# Patient Record
Sex: MALE | Race: WHITE | NOT HISPANIC OR LATINO | URBAN - METROPOLITAN AREA
[De-identification: names, ages, dates, MRNs, and addresses within clinical notes are randomized per-mention and may not be internally consistent; named-entity substitution may affect disease eponyms.]

---

## 2018-03-12 ENCOUNTER — EMERGENCY (EMERGENCY)
Facility: HOSPITAL | Age: 58
LOS: 1 days | Discharge: ANOTHER HOSPITAL | End: 2018-03-12
Attending: EMERGENCY MEDICINE
Payer: COMMERCIAL

## 2018-03-12 ENCOUNTER — INPATIENT (INPATIENT)
Facility: HOSPITAL | Age: 58
LOS: 2 days | Discharge: HOME | DRG: 247 | End: 2018-03-15
Attending: STUDENT IN AN ORGANIZED HEALTH CARE EDUCATION/TRAINING PROGRAM | Admitting: INTERNAL MEDICINE
Payer: COMMERCIAL

## 2018-03-12 VITALS
RESPIRATION RATE: 16 BRPM | DIASTOLIC BLOOD PRESSURE: 91 MMHG | SYSTOLIC BLOOD PRESSURE: 139 MMHG | TEMPERATURE: 99 F | OXYGEN SATURATION: 100 % | HEART RATE: 72 BPM

## 2018-03-12 VITALS
SYSTOLIC BLOOD PRESSURE: 129 MMHG | RESPIRATION RATE: 17 BRPM | WEIGHT: 199.96 LBS | OXYGEN SATURATION: 98 % | TEMPERATURE: 96 F | HEIGHT: 70 IN | HEART RATE: 67 BPM | DIASTOLIC BLOOD PRESSURE: 85 MMHG

## 2018-03-12 VITALS — WEIGHT: 204.15 LBS | HEIGHT: 69 IN

## 2018-03-12 DIAGNOSIS — Z87.891 PERSONAL HISTORY OF NICOTINE DEPENDENCE: ICD-10-CM

## 2018-03-12 DIAGNOSIS — I21.3 ST ELEVATION (STEMI) MYOCARDIAL INFARCTION OF UNSPECIFIED SITE: ICD-10-CM

## 2018-03-12 DIAGNOSIS — G62.9 POLYNEUROPATHY, UNSPECIFIED: ICD-10-CM

## 2018-03-12 DIAGNOSIS — R07.9 CHEST PAIN, UNSPECIFIED: ICD-10-CM

## 2018-03-12 DIAGNOSIS — I25.10 ATHEROSCLEROTIC HEART DISEASE OF NATIVE CORONARY ARTERY WITHOUT ANGINA PECTORIS: ICD-10-CM

## 2018-03-12 LAB
ALBUMIN SERPL ELPH-MCNC: 4.3 G/DL — SIGNIFICANT CHANGE UP (ref 3.3–5)
ALP SERPL-CCNC: 105 U/L — SIGNIFICANT CHANGE UP (ref 40–120)
ALT FLD-CCNC: 53 U/L — SIGNIFICANT CHANGE UP (ref 12–78)
ANION GAP SERPL CALC-SCNC: 7 MMOL/L — SIGNIFICANT CHANGE UP (ref 5–17)
APTT BLD: 28.2 SEC — SIGNIFICANT CHANGE UP (ref 27.5–37.4)
AST SERPL-CCNC: 31 U/L — SIGNIFICANT CHANGE UP (ref 15–37)
BASOPHILS # BLD AUTO: 0.06 K/UL — SIGNIFICANT CHANGE UP (ref 0–0.2)
BASOPHILS NFR BLD AUTO: 0.5 % — SIGNIFICANT CHANGE UP (ref 0–2)
BILIRUB SERPL-MCNC: 0.4 MG/DL — SIGNIFICANT CHANGE UP (ref 0.2–1.2)
BLD GP AB SCN SERPL QL: SIGNIFICANT CHANGE UP
BUN SERPL-MCNC: 22 MG/DL — SIGNIFICANT CHANGE UP (ref 7–23)
CALCIUM SERPL-MCNC: 8.7 MG/DL — SIGNIFICANT CHANGE UP (ref 8.5–10.1)
CHLORIDE SERPL-SCNC: 105 MMOL/L — SIGNIFICANT CHANGE UP (ref 96–108)
CK MB BLD-MCNC: 1.1 % — SIGNIFICANT CHANGE UP (ref 0–3.5)
CK MB CFR SERPL CALC: 2.7 NG/ML — SIGNIFICANT CHANGE UP (ref 0.5–3.6)
CK SERPL-CCNC: 248 U/L — SIGNIFICANT CHANGE UP (ref 26–308)
CO2 SERPL-SCNC: 30 MMOL/L — SIGNIFICANT CHANGE UP (ref 22–31)
CREAT SERPL-MCNC: 1.05 MG/DL — SIGNIFICANT CHANGE UP (ref 0.5–1.3)
EOSINOPHIL # BLD AUTO: 0.13 K/UL — SIGNIFICANT CHANGE UP (ref 0–0.5)
EOSINOPHIL NFR BLD AUTO: 1 % — SIGNIFICANT CHANGE UP (ref 0–6)
GLUCOSE SERPL-MCNC: 131 MG/DL — HIGH (ref 70–99)
HCT VFR BLD CALC: 48 % — SIGNIFICANT CHANGE UP (ref 39–50)
HGB BLD-MCNC: 15.8 G/DL — SIGNIFICANT CHANGE UP (ref 13–17)
IMM GRANULOCYTES NFR BLD AUTO: 0.3 % — SIGNIFICANT CHANGE UP (ref 0–1.5)
INR BLD: 0.99 RATIO — SIGNIFICANT CHANGE UP (ref 0.88–1.16)
LYMPHOCYTES # BLD AUTO: 1.82 K/UL — SIGNIFICANT CHANGE UP (ref 1–3.3)
LYMPHOCYTES # BLD AUTO: 14.2 % — SIGNIFICANT CHANGE UP (ref 13–44)
MCHC RBC-ENTMCNC: 30.3 PG — SIGNIFICANT CHANGE UP (ref 27–34)
MCHC RBC-ENTMCNC: 32.9 GM/DL — SIGNIFICANT CHANGE UP (ref 32–36)
MCV RBC AUTO: 92 FL — SIGNIFICANT CHANGE UP (ref 80–100)
MONOCYTES # BLD AUTO: 0.89 K/UL — SIGNIFICANT CHANGE UP (ref 0–0.9)
MONOCYTES NFR BLD AUTO: 6.9 % — SIGNIFICANT CHANGE UP (ref 2–14)
NEUTROPHILS # BLD AUTO: 9.88 K/UL — HIGH (ref 1.8–7.4)
NEUTROPHILS NFR BLD AUTO: 77.1 % — HIGH (ref 43–77)
NRBC # BLD: 0 /100 WBCS — SIGNIFICANT CHANGE UP (ref 0–0)
PLATELET # BLD AUTO: 261 K/UL — SIGNIFICANT CHANGE UP (ref 150–400)
POTASSIUM SERPL-MCNC: 3.9 MMOL/L — SIGNIFICANT CHANGE UP (ref 3.5–5.3)
POTASSIUM SERPL-SCNC: 3.9 MMOL/L — SIGNIFICANT CHANGE UP (ref 3.5–5.3)
PROT SERPL-MCNC: 7.4 GM/DL — SIGNIFICANT CHANGE UP (ref 6–8.3)
PROTHROM AB SERPL-ACNC: 10.8 SEC — SIGNIFICANT CHANGE UP (ref 9.8–12.7)
RBC # BLD: 5.22 M/UL — SIGNIFICANT CHANGE UP (ref 4.2–5.8)
RBC # FLD: 13.4 % — SIGNIFICANT CHANGE UP (ref 10.3–14.5)
SODIUM SERPL-SCNC: 142 MMOL/L — SIGNIFICANT CHANGE UP (ref 135–145)
TROPONIN I SERPL-MCNC: 0.08 NG/ML — HIGH (ref 0.01–0.04)
WBC # BLD: 12.82 K/UL — HIGH (ref 3.8–10.5)
WBC # FLD AUTO: 12.82 K/UL — HIGH (ref 3.8–10.5)

## 2018-03-12 PROCEDURE — 92929: CPT | Mod: NC

## 2018-03-12 PROCEDURE — 99152 MOD SED SAME PHYS/QHP 5/>YRS: CPT

## 2018-03-12 PROCEDURE — 71045 X-RAY EXAM CHEST 1 VIEW: CPT | Mod: 26

## 2018-03-12 PROCEDURE — 99291 CRITICAL CARE FIRST HOUR: CPT

## 2018-03-12 PROCEDURE — 71275 CT ANGIOGRAPHY CHEST: CPT | Mod: 26

## 2018-03-12 PROCEDURE — 92941 PRQ TRLML REVSC TOT OCCL AMI: CPT | Mod: LC

## 2018-03-12 PROCEDURE — 93458 L HRT ARTERY/VENTRICLE ANGIO: CPT | Mod: 26,59

## 2018-03-12 PROCEDURE — 74174 CTA ABD&PLVS W/CONTRAST: CPT | Mod: 26

## 2018-03-12 RX ORDER — TICAGRELOR 90 MG/1
90 TABLET ORAL
Qty: 0 | Refills: 0 | Status: DISCONTINUED | OUTPATIENT
Start: 2018-03-12 | End: 2018-03-15

## 2018-03-12 RX ORDER — HEPARIN SODIUM 5000 [USP'U]/ML
4000 INJECTION INTRAVENOUS; SUBCUTANEOUS EVERY 6 HOURS
Qty: 0 | Refills: 0 | Status: DISCONTINUED | OUTPATIENT
Start: 2018-03-12 | End: 2018-03-16

## 2018-03-12 RX ORDER — HEPARIN SODIUM 5000 [USP'U]/ML
4000 INJECTION INTRAVENOUS; SUBCUTANEOUS ONCE
Qty: 0 | Refills: 0 | Status: DISCONTINUED | OUTPATIENT
Start: 2018-03-12 | End: 2018-03-16

## 2018-03-12 RX ORDER — MORPHINE SULFATE 50 MG/1
4 CAPSULE, EXTENDED RELEASE ORAL ONCE
Qty: 0 | Refills: 0 | Status: DISCONTINUED | OUTPATIENT
Start: 2018-03-12 | End: 2018-03-12

## 2018-03-12 RX ORDER — INFLUENZA VIRUS VACCINE 15; 15; 15; 15 UG/.5ML; UG/.5ML; UG/.5ML; UG/.5ML
0.5 SUSPENSION INTRAMUSCULAR ONCE
Qty: 0 | Refills: 0 | Status: DISCONTINUED | OUTPATIENT
Start: 2018-03-12 | End: 2018-03-15

## 2018-03-12 RX ORDER — ASPIRIN/CALCIUM CARB/MAGNESIUM 324 MG
162 TABLET ORAL ONCE
Qty: 0 | Refills: 0 | Status: COMPLETED | OUTPATIENT
Start: 2018-03-12 | End: 2018-03-12

## 2018-03-12 RX ORDER — HEPARIN SODIUM 5000 [USP'U]/ML
5000 INJECTION INTRAVENOUS; SUBCUTANEOUS EVERY 12 HOURS
Qty: 0 | Refills: 0 | Status: DISCONTINUED | OUTPATIENT
Start: 2018-03-12 | End: 2018-03-15

## 2018-03-12 RX ORDER — HEPARIN SODIUM 5000 [USP'U]/ML
INJECTION INTRAVENOUS; SUBCUTANEOUS
Qty: 25000 | Refills: 0 | Status: DISCONTINUED | OUTPATIENT
Start: 2018-03-12 | End: 2018-03-16

## 2018-03-12 RX ORDER — SODIUM CHLORIDE 9 MG/ML
3 INJECTION INTRAMUSCULAR; INTRAVENOUS; SUBCUTANEOUS ONCE
Qty: 0 | Refills: 0 | Status: COMPLETED | OUTPATIENT
Start: 2018-03-12 | End: 2018-03-12

## 2018-03-12 RX ORDER — SODIUM CHLORIDE 9 MG/ML
1000 INJECTION INTRAMUSCULAR; INTRAVENOUS; SUBCUTANEOUS ONCE
Qty: 0 | Refills: 0 | Status: COMPLETED | OUTPATIENT
Start: 2018-03-12 | End: 2018-03-12

## 2018-03-12 RX ORDER — ASPIRIN/CALCIUM CARB/MAGNESIUM 324 MG
81 TABLET ORAL DAILY
Qty: 0 | Refills: 0 | Status: DISCONTINUED | OUTPATIENT
Start: 2018-03-12 | End: 2018-03-15

## 2018-03-12 RX ORDER — TICAGRELOR 90 MG/1
180 TABLET ORAL ONCE
Qty: 0 | Refills: 0 | Status: COMPLETED | OUTPATIENT
Start: 2018-03-12 | End: 2018-03-12

## 2018-03-12 RX ORDER — ATORVASTATIN CALCIUM 80 MG/1
80 TABLET, FILM COATED ORAL AT BEDTIME
Qty: 0 | Refills: 0 | Status: DISCONTINUED | OUTPATIENT
Start: 2018-03-12 | End: 2018-03-15

## 2018-03-12 RX ADMIN — SODIUM CHLORIDE 2000 MILLILITER(S): 9 INJECTION INTRAMUSCULAR; INTRAVENOUS; SUBCUTANEOUS at 21:31

## 2018-03-12 RX ADMIN — MORPHINE SULFATE 4 MILLIGRAM(S): 50 CAPSULE, EXTENDED RELEASE ORAL at 21:30

## 2018-03-12 RX ADMIN — SODIUM CHLORIDE 3 MILLILITER(S): 9 INJECTION INTRAMUSCULAR; INTRAVENOUS; SUBCUTANEOUS at 21:31

## 2018-03-12 RX ADMIN — Medication 162 MILLIGRAM(S): at 21:30

## 2018-03-12 RX ADMIN — TICAGRELOR 180 MILLIGRAM(S): 90 TABLET ORAL at 21:30

## 2018-03-12 NOTE — ED PROVIDER NOTE - MEDICAL DECISION MAKING DETAILS
Called [facility] immediately after abnormal EKG.  Fellow reviewed EKG and approved lights&sirens tranfer of patient for emergent cath.  Patient given Brilinta, ASA, and heparin, controlled pain and BP upon arrival  of EMS.  Transferred to [facility] Cath lab for definitive treatment.

## 2018-03-12 NOTE — ED PROVIDER NOTE - NS ED ROS FT
Constitutional: (-) fever  (-)chills  (-)sweats  Eyes/ENT: (-) blurry vision, (-) epistaxis  (-)rhinorrhea   (-) sore throat    Cardiovascular: (-+) chest pain, (+) palpitations (-) edema   Respiratory: (-) cough, (-) shortness of breath   Gastrointestinal: (+)nausea  (-)vomiting, (-) diarrhea  (-) abdominal pain   Musculoskeletal: (-) neck pain, (-) back pain, (-) joint pain  Integumentary: (-) rash, (-) edema  Neurological: (-) headache, (-) altered mental status  (-)LOC

## 2018-03-13 LAB
ALBUMIN SERPL ELPH-MCNC: 4 G/DL — SIGNIFICANT CHANGE UP (ref 3.3–5)
ALBUMIN SERPL ELPH-MCNC: 4.1 G/DL — SIGNIFICANT CHANGE UP (ref 3.3–5)
ALP SERPL-CCNC: 101 U/L — SIGNIFICANT CHANGE UP (ref 40–120)
ALP SERPL-CCNC: 98 U/L — SIGNIFICANT CHANGE UP (ref 40–120)
ALT FLD-CCNC: 42 U/L RC — SIGNIFICANT CHANGE UP (ref 10–45)
ALT FLD-CCNC: 52 U/L RC — HIGH (ref 10–45)
ANION GAP SERPL CALC-SCNC: 12 MMOL/L — SIGNIFICANT CHANGE UP (ref 5–17)
ANION GAP SERPL CALC-SCNC: 13 MMOL/L — SIGNIFICANT CHANGE UP (ref 5–17)
APTT BLD: 175.7 SEC — CRITICAL HIGH (ref 27.5–37.4)
AST SERPL-CCNC: 182 U/L — HIGH (ref 10–40)
AST SERPL-CCNC: 76 U/L — HIGH (ref 10–40)
BASOPHILS # BLD AUTO: 0 K/UL — SIGNIFICANT CHANGE UP (ref 0–0.2)
BASOPHILS NFR BLD AUTO: 0.2 % — SIGNIFICANT CHANGE UP (ref 0–2)
BILIRUB SERPL-MCNC: 0.3 MG/DL — SIGNIFICANT CHANGE UP (ref 0.2–1.2)
BILIRUB SERPL-MCNC: 0.6 MG/DL — SIGNIFICANT CHANGE UP (ref 0.2–1.2)
BUN SERPL-MCNC: 14 MG/DL — SIGNIFICANT CHANGE UP (ref 7–23)
BUN SERPL-MCNC: 18 MG/DL — SIGNIFICANT CHANGE UP (ref 7–23)
CALCIUM SERPL-MCNC: 8.8 MG/DL — SIGNIFICANT CHANGE UP (ref 8.4–10.5)
CALCIUM SERPL-MCNC: 9.2 MG/DL — SIGNIFICANT CHANGE UP (ref 8.4–10.5)
CHLORIDE SERPL-SCNC: 101 MMOL/L — SIGNIFICANT CHANGE UP (ref 96–108)
CHLORIDE SERPL-SCNC: 104 MMOL/L — SIGNIFICANT CHANGE UP (ref 96–108)
CHOLEST SERPL-MCNC: 205 MG/DL — HIGH (ref 10–199)
CK MB BLD-MCNC: 11.2 % — HIGH (ref 0–3.5)
CK MB BLD-MCNC: 11.7 % — HIGH (ref 0–3.5)
CK MB BLD-MCNC: 14.8 % — HIGH (ref 0–3.5)
CK MB CFR SERPL CALC: 158.7 NG/ML — HIGH (ref 0–6.7)
CK MB CFR SERPL CALC: 161.7 NG/ML — HIGH (ref 0–6.7)
CK MB CFR SERPL CALC: 272.2 NG/ML — HIGH (ref 0–6.7)
CK SERPL-CCNC: 1071 U/L — HIGH (ref 30–200)
CK SERPL-CCNC: 1443 U/L — HIGH (ref 30–200)
CK SERPL-CCNC: 2320 U/L — HIGH (ref 30–200)
CO2 SERPL-SCNC: 22 MMOL/L — SIGNIFICANT CHANGE UP (ref 22–31)
CO2 SERPL-SCNC: 25 MMOL/L — SIGNIFICANT CHANGE UP (ref 22–31)
CREAT SERPL-MCNC: 0.86 MG/DL — SIGNIFICANT CHANGE UP (ref 0.5–1.3)
CREAT SERPL-MCNC: 0.89 MG/DL — SIGNIFICANT CHANGE UP (ref 0.5–1.3)
EOSINOPHIL # BLD AUTO: 0 K/UL — SIGNIFICANT CHANGE UP (ref 0–0.5)
EOSINOPHIL NFR BLD AUTO: 0.1 % — SIGNIFICANT CHANGE UP (ref 0–6)
GLUCOSE SERPL-MCNC: 108 MG/DL — HIGH (ref 70–99)
GLUCOSE SERPL-MCNC: 109 MG/DL — HIGH (ref 70–99)
HBA1C BLD-MCNC: 5.7 % — HIGH (ref 4–5.6)
HCT VFR BLD CALC: 46 % — SIGNIFICANT CHANGE UP (ref 39–50)
HDLC SERPL-MCNC: 47 MG/DL — SIGNIFICANT CHANGE UP (ref 40–125)
HGB BLD-MCNC: 15.9 G/DL — SIGNIFICANT CHANGE UP (ref 13–17)
LIPID PNL WITH DIRECT LDL SERPL: 150 MG/DL — HIGH
LYMPHOCYTES # BLD AUTO: 0.9 K/UL — LOW (ref 1–3.3)
LYMPHOCYTES # BLD AUTO: 7.2 % — LOW (ref 13–44)
MAGNESIUM SERPL-MCNC: 2 MG/DL — SIGNIFICANT CHANGE UP (ref 1.6–2.6)
MAGNESIUM SERPL-MCNC: 2.3 MG/DL — SIGNIFICANT CHANGE UP (ref 1.6–2.6)
MCHC RBC-ENTMCNC: 32.7 PG — SIGNIFICANT CHANGE UP (ref 27–34)
MCHC RBC-ENTMCNC: 34.7 GM/DL — SIGNIFICANT CHANGE UP (ref 32–36)
MCV RBC AUTO: 94.3 FL — SIGNIFICANT CHANGE UP (ref 80–100)
MONOCYTES # BLD AUTO: 0.4 K/UL — SIGNIFICANT CHANGE UP (ref 0–0.9)
MONOCYTES NFR BLD AUTO: 2.8 % — SIGNIFICANT CHANGE UP (ref 2–14)
NEUTROPHILS # BLD AUTO: 11.6 K/UL — HIGH (ref 1.8–7.4)
NEUTROPHILS NFR BLD AUTO: 89.7 % — HIGH (ref 43–77)
PHOSPHATE SERPL-MCNC: 2.2 MG/DL — LOW (ref 2.5–4.5)
PLATELET # BLD AUTO: 241 K/UL — SIGNIFICANT CHANGE UP (ref 150–400)
POTASSIUM SERPL-MCNC: 4.1 MMOL/L — SIGNIFICANT CHANGE UP (ref 3.5–5.3)
POTASSIUM SERPL-MCNC: 4.1 MMOL/L — SIGNIFICANT CHANGE UP (ref 3.5–5.3)
POTASSIUM SERPL-SCNC: 4.1 MMOL/L — SIGNIFICANT CHANGE UP (ref 3.5–5.3)
POTASSIUM SERPL-SCNC: 4.1 MMOL/L — SIGNIFICANT CHANGE UP (ref 3.5–5.3)
PROT SERPL-MCNC: 6.8 G/DL — SIGNIFICANT CHANGE UP (ref 6–8.3)
PROT SERPL-MCNC: 7.2 G/DL — SIGNIFICANT CHANGE UP (ref 6–8.3)
RBC # BLD: 4.88 M/UL — SIGNIFICANT CHANGE UP (ref 4.2–5.8)
RBC # FLD: 12.4 % — SIGNIFICANT CHANGE UP (ref 10.3–14.5)
SODIUM SERPL-SCNC: 138 MMOL/L — SIGNIFICANT CHANGE UP (ref 135–145)
SODIUM SERPL-SCNC: 139 MMOL/L — SIGNIFICANT CHANGE UP (ref 135–145)
TOTAL CHOLESTEROL/HDL RATIO MEASUREMENT: 4.4 RATIO — SIGNIFICANT CHANGE UP (ref 3.4–9.6)
TRIGL SERPL-MCNC: 41 MG/DL — SIGNIFICANT CHANGE UP (ref 10–149)
TROPONIN T SERPL-MCNC: 0.61 NG/ML — HIGH (ref 0–0.06)
TROPONIN T SERPL-MCNC: 2.89 NG/ML — HIGH (ref 0–0.06)
TROPONIN T SERPL-MCNC: 3.42 NG/ML — HIGH (ref 0–0.06)
TSH SERPL-MCNC: 1.21 UIU/ML — SIGNIFICANT CHANGE UP (ref 0.27–4.2)
WBC # BLD: 12.9 K/UL — HIGH (ref 3.8–10.5)
WBC # FLD AUTO: 12.9 K/UL — HIGH (ref 3.8–10.5)

## 2018-03-13 PROCEDURE — 99291 CRITICAL CARE FIRST HOUR: CPT

## 2018-03-13 PROCEDURE — 93010 ELECTROCARDIOGRAM REPORT: CPT

## 2018-03-13 RX ORDER — METOPROLOL TARTRATE 50 MG
25 TABLET ORAL
Qty: 0 | Refills: 0 | Status: DISCONTINUED | OUTPATIENT
Start: 2018-03-13 | End: 2018-03-13

## 2018-03-13 RX ORDER — AMIODARONE HYDROCHLORIDE 400 MG/1
150 TABLET ORAL ONCE
Qty: 0 | Refills: 0 | Status: COMPLETED | OUTPATIENT
Start: 2018-03-13 | End: 2018-03-13

## 2018-03-13 RX ORDER — LIDOCAINE HCL 20 MG/ML
92 VIAL (ML) INJECTION ONCE
Qty: 0 | Refills: 0 | Status: COMPLETED | OUTPATIENT
Start: 2018-03-13 | End: 2018-03-13

## 2018-03-13 RX ORDER — MAGNESIUM SULFATE 500 MG/ML
1 VIAL (ML) INJECTION ONCE
Qty: 0 | Refills: 0 | Status: COMPLETED | OUTPATIENT
Start: 2018-03-13 | End: 2018-03-13

## 2018-03-13 RX ORDER — METOPROLOL TARTRATE 50 MG
25 TABLET ORAL EVERY 8 HOURS
Qty: 0 | Refills: 0 | Status: DISCONTINUED | OUTPATIENT
Start: 2018-03-13 | End: 2018-03-13

## 2018-03-13 RX ORDER — SODIUM,POTASSIUM PHOSPHATES 278-250MG
1 POWDER IN PACKET (EA) ORAL EVERY 4 HOURS
Qty: 0 | Refills: 0 | Status: COMPLETED | OUTPATIENT
Start: 2018-03-13 | End: 2018-03-13

## 2018-03-13 RX ORDER — AMIODARONE HYDROCHLORIDE 400 MG/1
150 TABLET ORAL ONCE
Qty: 0 | Refills: 0 | Status: DISCONTINUED | OUTPATIENT
Start: 2018-03-13 | End: 2018-03-13

## 2018-03-13 RX ORDER — METOPROLOL TARTRATE 50 MG
25 TABLET ORAL EVERY 8 HOURS
Qty: 0 | Refills: 0 | Status: DISCONTINUED | OUTPATIENT
Start: 2018-03-13 | End: 2018-03-15

## 2018-03-13 RX ADMIN — Medication 25 MILLIGRAM(S): at 21:20

## 2018-03-13 RX ADMIN — TICAGRELOR 90 MILLIGRAM(S): 90 TABLET ORAL at 18:10

## 2018-03-13 RX ADMIN — Medication 1 TABLET(S): at 07:10

## 2018-03-13 RX ADMIN — Medication 92 MILLIGRAM(S): at 05:52

## 2018-03-13 RX ADMIN — TICAGRELOR 90 MILLIGRAM(S): 90 TABLET ORAL at 05:05

## 2018-03-13 RX ADMIN — HEPARIN SODIUM 5000 UNIT(S): 5000 INJECTION INTRAVENOUS; SUBCUTANEOUS at 05:04

## 2018-03-13 RX ADMIN — AMIODARONE HYDROCHLORIDE 600 MILLIGRAM(S): 400 TABLET ORAL at 03:03

## 2018-03-13 RX ADMIN — HEPARIN SODIUM 5000 UNIT(S): 5000 INJECTION INTRAVENOUS; SUBCUTANEOUS at 18:10

## 2018-03-13 RX ADMIN — Medication 25 MILLIGRAM(S): at 08:27

## 2018-03-13 RX ADMIN — Medication 100 GRAM(S): at 03:08

## 2018-03-13 RX ADMIN — ATORVASTATIN CALCIUM 80 MILLIGRAM(S): 80 TABLET, FILM COATED ORAL at 21:20

## 2018-03-13 RX ADMIN — Medication 81 MILLIGRAM(S): at 11:41

## 2018-03-13 RX ADMIN — Medication 1 TABLET(S): at 05:04

## 2018-03-13 NOTE — CHART NOTE - NSCHARTNOTEFT_GEN_A_CORE
Removal of Femoral Sheath    Pulses in the right lower extremity are palpable. The patient was placed in the supine position. The insertion site was identified and the sutures were removed per protocol.  The 6 [place] femoral sheath was then removed. Direct pressure was applied for  20 minutes.     Monitoring of the right groin and both lower extremities including neuro-vascular checks and vital signs every 15 minutes x 4, then every 30 minutes x 2, then every 1 hour was ordered.    Complications: None

## 2018-03-13 NOTE — H&P ADULT - ATTENDING COMMENTS
Patient seen and examined. Agree with assessment and plan as outlined above.  59 yo M with inferior/posterior STEMI s/p PCI to totally occluded LCx. Patient for staged PCI to RCA. Mulitple episodes of NSVT. Patient given IV amiodarone/IV labetalol. Start metoprolol 25 mg q8hr. Consider IABP if recurrent VT. Continue CCU care.

## 2018-03-13 NOTE — H&P ADULT - NSHPLABSRESULTS_GEN_ALL_CORE
15.9   12.9  )-----------( 241      ( 13 Mar 2018 01:35 )             46.0       03-13    138  |  101  |  18  ----------------------------<  109<H>  4.1   |  25  |  0.89    Ca    8.8      13 Mar 2018 01:35  Phos  2.2     03-13  Mg     2.0     03-13    TPro  7.2  /  Alb  4.1  /  TBili  0.3  /  DBili  x   /  AST  76<H>  /  ALT  42  /  AlkPhos  98  03-13    LHC:  CORONARY VESSELS: The coronary circulation is right dominant.  LM:   --  LM: Normal.  LAD:   --  LAD: Angiography showed minor luminal irregularities with no  flow limiting lesions.  --  Proximal LAD: There was a discrete 30 % stenosis.  --  Mid LAD: There was a discrete 40 % stenosis.  --  D2: The vessel was very small sized. There was a discrete 80 % stenosis  in the proximal third of the vessel segment. There was RODRICK grade 3 flow  through the vessel (brisk flow).  CX:   --  Mid circumflex: There was a 100 % stenosis.  RCA:   --  Proximal RCA: There was a discrete 70 % stenosis. There was RODRICK  grade 3 flow through the vessel (brisk flow).  COMPLICATIONS: There were no complications.  SUMMARY:  1ST LESION INTERVENTIONS: A successful balloon angioplasty with stent was  performed on the 100 % lesion in the mid circumflex. Following  intervention there was a 1 % residual stenosis.                    PT/INR - ( 12 Mar 2018 21:03 )   PT: 10.8 sec;   INR: 0.99 ratio         PTT - ( 13 Mar 2018 01:35 )  PTT:175.7 sec        CARDIAC MARKERS ( 13 Mar 2018 01:35 )  x     / 0.61 ng/mL / 1071 U/L / x     / 158.7 ng/mL  CARDIAC MARKERS ( 12 Mar 2018 21:03 )  .081 ng/mL / x     / 248 U/L / x     / 2.7 ng/mL      EKG- personally interpreted NSR, HR 69, QTc 411, normal axis, ST seg elevations in posterior leads and III, ST seg depression in V3 15.9   12.9  )-----------( 241      ( 13 Mar 2018 01:35 )             46.0       03-13    138  |  101  |  18  ----------------------------<  109<H>  4.1   |  25  |  0.89    Ca    8.8      13 Mar 2018 01:35  Phos  2.2     03-13  Mg     2.0     03-13    TPro  7.2  /  Alb  4.1  /  TBili  0.3  /  DBili  x   /  AST  76<H>  /  ALT  42  /  AlkPhos  98  03-13      PT/INR - ( 12 Mar 2018 21:03 )   PT: 10.8 sec;   INR: 0.99 ratio         PTT - ( 13 Mar 2018 01:35 )  PTT:175.7 sec        CARDIAC MARKERS ( 13 Mar 2018 01:35 )  x     / 0.61 ng/mL / 1071 U/L / x     / 158.7 ng/mL  CARDIAC MARKERS ( 12 Mar 2018 21:03 )  .081 ng/mL / x     / 248 U/L / x     / 2.7 ng/mL      EKG- personally interpreted NSR, HR 69, QTc 411, normal axis, ST seg elevations in posterior leads and III, ST seg depression in V3    LHC:  CORONARY VESSELS: The coronary circulation is right dominant.  LM:   --  LM: Normal.  LAD:   --  LAD: Angiography showed minor luminal irregularities with no  flow limiting lesions.  --  Proximal LAD: There was a discrete 30 % stenosis.  --  Mid LAD: There was a discrete 40 % stenosis.  --  D2: The vessel was very small sized. There was a discrete 80 % stenosis  in the proximal third of the vessel segment. There was RODRICK grade 3 flow  through the vessel (brisk flow).  CX:   --  Mid circumflex: There was a 100 % stenosis.  RCA:   --  Proximal RCA: There was a discrete 70 % stenosis. There was RODRICK  grade 3 flow through the vessel (brisk flow).  COMPLICATIONS: There were no complications.  SUMMARY:  1ST LESION INTERVENTIONS: A successful balloon angioplasty with stent was  performed on the 100 % lesion in the mid circumflex. Following  intervention there was a 1 % residual stenosis.    < from: CT Angio Chest w/ IV Cont (03.12.18 @ 21:18) >    IMPRESSION: No aortic dissection.    < end of copied text >

## 2018-03-13 NOTE — PROVIDER CONTACT NOTE (CRITICAL VALUE NOTIFICATION) - ASSESSMENT
nos/s pfbleeding rt groinnsheathnin ploace , site unremarkable, rt wrist attempted cannulation siteradialband in place, site beign

## 2018-03-13 NOTE — PROGRESS NOTE ADULT - SUBJECTIVE AND OBJECTIVE BOX
Patient is a 58y old  Male who presents with a chief complaint of Chest pain (13 Mar 2018 01:20)    Event:  Had persistent runs of NSVT during which patient reported feeling lightheaded and dizzy. Gave 150mg amio and then Lidocaine 1mg/kg.   After lidocaine, patient reported feeling lightheaded, but remained HD stable and had less episodes of NSVT.   Patient reports mild persistent CP, but significantly improved from before.   Denies any SOB, abd pain, N/V, dysuria or headaches.     HPI:  57 y/o M w/ PMHx of GERD p/w chest pain.   Patient reports developing CP on his way home from work around 6:30pm. He describes the pain as a pressure across his entire chest. He reports developing bilateral shoulder pain radiating to his axilla with the chest pain. Also acknowledges generalized weakness.  When he arrived ned he tried lying down, but reports the pain persisted and he became diaphoretic.   His pain didn't improve so he went to [facility] ED.   Of note, the patient reports developing worsening MORRISON over the last few months, especially while ascending stairs.     In the ED,   Given Heparin, ASA and transferred to [facility] for emergent LHC.   Had CT angio chest/A/P- no evidence of aortic dissection (13 Mar 2018 01:20)    MEDICATIONS  (STANDING):  aspirin enteric coated 81 milliGRAM(s) Oral daily  atorvastatin 80 milliGRAM(s) Oral at bedtime  heparin  Injectable 5000 Unit(s) SubCutaneous every 12 hours  influenza   Vaccine 0.5 milliLiter(s) IntraMuscular once  potassium acid phosphate/sodium acid phosphate tablet (K-PHOS No. 2) 1 Tablet(s) Oral every 4 hours  ticagrelor 90 milliGRAM(s) Oral two times a day    MEDICATIONS  (PRN):      REVIEW OF SYSTEMS:  Otherwise, 10 point ROS done and otherwise negative.    PHYSICAL EXAM:  Vital Signs Last 24 Hrs  T(C): 36.7 (13 Mar 2018 03:00), Max: 37.1 (12 Mar 2018 20:40)  T(F): 98.1 (13 Mar 2018 03:00), Max: 98.7 (12 Mar 2018 20:40)  HR: 76 (13 Mar 2018 04:00) (67 - 88)  BP: 122/72 (13 Mar 2018 04:00) (93/59 - 139/91)  BP(mean): 90 (13 Mar 2018 04:00) (71 - 92)  RR: 19 (13 Mar 2018 04:00) (16 - 26)  SpO2: 92% (13 Mar 2018 01:45) (92% - 100%)  I&O's Summary    12 Mar 2018 07:01  -  13 Mar 2018 06:20  --------------------------------------------------------  IN: 350 mL / OUT: 650 mL / NET: -300 mL        	PHYSICAL EXAM:    GENERAL: Comfortable, no acute distress   HEAD:  Normocephalic, atraumatic  EYES: EOMI, PERRLA  HEENT: Moist mucous membranes  NECK: Supple, No JVD  NERVOUS SYSTEM:  Alert & Oriented X3, Motor Strength 5/5 B/L upper and lower extremities  CHEST/LUNG: Clear to auscultation bilaterally  HEART: Regular rate and rhythm, no murmur   ABDOMEN: Soft, Nontender, Nondistended, Bowel sounds present  EXTREMITIES:   No clubbing, cyanosis, or edema  MUSCULOSKELETAL: No muscle tenderness, no joint tenderness  SKIN:  warm and dry, no rash, R radial wrist band removed, R femoral sheath removed, areas soft, no hematoma or signs of active bleeding    LABS:	 	                        15.9   12.9  )-----------( 241      ( 13 Mar 2018 01:35 )             46.0     Auto Eosinophil # 0.0   / Auto Eosinophil % 0.1   / Auto Neutrophil # 11.6  / Auto Neutrophil % 89.7  / BANDS % x                            15.8   12.82 )-----------( 261      ( 12 Mar 2018 21:03 )             48.0     Auto Eosinophil # 0.13  / Auto Eosinophil % 1.0   / Auto Neutrophil # 9.88  / Auto Neutrophil % 77.1  / BANDS % x        INR: 0.99 ratio (03-12 @ 21:03)    03-13    138  |  101  |  18  ----------------------------<  109<H>  4.1   |  25  |  0.89  03-12    142  |  105  |  22  ----------------------------<  131<H>  3.9   |  30  |  1.05    Ca    8.8      13 Mar 2018 01:35  Mg     2.0     03-13  Phos  2.2     03-13  TPro  7.2  /  Alb  4.1  /  TBili  0.3  /  DBili  x   /  AST  76<H>  /  ALT  42  /  AlkPhos  98  03-13  TPro  7.4  /  Alb  4.3  /  TBili  0.4  /  DBili  x   /  AST  31  /  ALT  53  /  AlkPhos  105  03-12        proBNP:   Lipid Profile:   HgA1c: 5.7 % (03-13 @ 02:29)    TSH:     CARDIAC MARKERS:   13 Mar 2018 01:35 Troponin 0.61 ng/mL / Creatine Kinase 1071 U/L /  CKMB 158.7 ng/mL / CPK Mass Assay % 14.8 %   12 Mar 2018 21:03 Troponin x     / Creatine Kinase 248 U/L /  CKMB 2.7 ng/mL / CPK Mass Assay % 1.1 %        TELEMETRY: 	    ECG:  	  RADIOLOGY:  OTHER: 	    PREVIOUS DIAGNOSTIC TESTING:    [ ] Echocardiogram:  [X ]  Catheterization:  	CORONARY VESSELS: The coronary circulation is right dominant.  	LM:   --  LM: Normal.  	LAD:   --  LAD: Angiography showed minor luminal irregularities with no  	flow limiting lesions.  	--  Proximal LAD: There was a discrete 30 % stenosis.  	--  Mid LAD: There was a discrete 40 % stenosis.  	--  D2: The vessel was very small sized. There was a discrete 80 % stenosis  	in the proximal third of the vessel segment. There was RODRICK grade 3 flow  	through the vessel (brisk flow).  	CX:   --  Mid circumflex: There was a 100 % stenosis.  	RCA:   --  Proximal RCA: There was a discrete 70 % stenosis. There was RODRICK  	grade 3 flow through the vessel (brisk flow).  	COMPLICATIONS: There were no complications.  	SUMMARY:  	1ST LESION INTERVENTIONS: A successful balloon angioplasty with stent was  	performed on the 100 % lesion in the mid circumflex. Following  	intervention there was a 1 % residual stenosis.  [ ] Stress Test:

## 2018-03-13 NOTE — H&P ADULT - ASSESSMENT
57 y/o M w/ PMHx of GERD admitted for inferior wall STEMI s/p PCI w/ SABINE to L circumflex w/ plan for staged PCI to RCA.     #Neuro- A&Ox3, stable    #CV- s/p PCI to Lcx w/ planned staging to RCA on 3/14  - started on ASA, Brilinta and Lipitor 80mg   - BP soft with elevated EDP, will wait on starting BB and ACEi   - Had multiple runs of NSVT and was symptomatic so gave 150mg amio and 1gr Mag  - trending Lyudmila     #Pulm- sating well, stable     #GI- tolerating diet, stable     #Renal-   #Electrolytes-   #ID-  #Heme-  #Endocrine-  #PPx-  #Dispo- 59 y/o M w/ PMHx of GERD admitted for inferior wall STEMI s/p PCI w/ SABINE to L circumflex w/ plan for staged PCI to RCA.     #Neuro- A&Ox3, stable    #CV- s/p PCI to Lcx w/ planned staging to RCA on 3/14  - started on ASA, Brilinta and Lipitor 80mg   - BP soft with elevated EDP, will wait on starting BB and ACEi   - Had multiple runs of NSVT and was symptomatic so gave 150mg amio and 1gr Mag  - trending Lyudmila     #Pulm- sating well, stable     #GI- tolerating diet, stable     #Renal- stable, urinating well     #Electrolytes- repleted Phos     #ID- no evidence of active infection     #Endocrine- A1c 5.7, otherwise stable     #PPx- HSQ    Dylan Stewart MD PGY2  [phone] / [phone] 57 y/o M w/ PMHx of GERD admitted for inferior wall STEMI s/p PCI w/ SABINE to L circumflex w/ plan for staged PCI to RCA.     #Neuro- A&Ox3, stable    #CV- s/p PCI to Lcx w/ planned staging to RCA on 3/14  - started on ASA, Brilinta and Lipitor 80mg   - BP soft with elevated EDP, will wait on starting BB and ACEi   - Had multiple runs of NSVT and was symptomatic so gave 150mg amio and 1gr Mag  - trending Lyudmila   - also had CT C/A/P which was neg for aortic dissection     #Pulm- sating well, stable     #GI- tolerating diet, stable     #Renal- stable, urinating well     #Electrolytes- repleted Phos     #ID- no evidence of active infection     #Endocrine- A1c 5.7, otherwise stable     #PPx- HSQ    Dylan Stewart MD PGY2  [phone] / [phone]

## 2018-03-13 NOTE — H&P ADULT - HISTORY OF PRESENT ILLNESS
57 y/o M w/ PMHx of GERD p/w chest pain.   Patient reports developing CP on his way home from work around 6:30pm. He describes the pain as a pressure across his entire chest. He reports developing bilateral shoulder pain radiating to his axilla with the chest pain. Also acknowledges generalized weakness.  When he arrived ned he tried lying down, but reports the pain persisted and he became diaphoretic.   His pain didn't improve so he went to [facility] ED.   Of note, the patient reports developing worsening MORRISON over the last few months, especially while ascending stairs.     In the ED, 59 y/o M w/ PMHx of GERD p/w chest pain.   Patient reports developing CP on his way home from work around 6:30pm. He describes the pain as a pressure across his entire chest. He reports developing bilateral shoulder pain radiating to his axilla with the chest pain. Also acknowledges generalized weakness.  When he arrived ned he tried lying down, but reports the pain persisted and he became diaphoretic.   His pain didn't improve so he went to [facility] ED.   Of note, the patient reports developing worsening MORRISON over the last few months, especially while ascending stairs.     In the ED,   Given Heparin, ASA and transferred to [facility] for emergent LHC. 59 y/o M w/ PMHx of GERD p/w chest pain.   Patient reports developing CP on his way home from work around 6:30pm. He describes the pain as a pressure across his entire chest. He reports developing bilateral shoulder pain radiating to his axilla with the chest pain. Also acknowledges generalized weakness.  When he arrived ned he tried lying down, but reports the pain persisted and he became diaphoretic.   His pain didn't improve so he went to [facility] ED.   Of note, the patient reports developing worsening MORRISON over the last few months, especially while ascending stairs.     In the ED,   Given Heparin, ASA and transferred to [facility] for emergent LHC.   Had CT angio chest/A/P- no evidence of aortic dissection

## 2018-03-13 NOTE — PROGRESS NOTE ADULT - ASSESSMENT
57 y/o M w/ PMHx of GERD admitted for inferior wall STEMI s/p PCI w/ SABINE to L circumflex w/ plan for staged PCI to RCA.     #Neuro- A&Ox3, stable    #CV- s/p PCI to Lcx w/ planned staging to RCA on 3/14  - started on ASA, Brilinta and Lipitor 80mg   - BP soft with elevated EDP, will wait on starting BB and ACEi   - Had multiple runs of NSVT and was symptomatic so gave 150mg amio and 1gr Mag  - trending Lyudmila   - also had CT C/A/P which was neg for aortic dissection     #Pulm- sating well, stable     #GI- tolerating diet, stable     #Renal- stable, urinating well     #Electrolytes- repleted Phos     #ID- no evidence of active infection     #Endocrine- A1c 5.7, otherwise stable     #PPx- HSQ    Dylan Stewart MD PGY2  [phone] / [phone] 57 y/o M w/ PMHx of GERD admitted for inferior wall STEMI s/p PCI w/ SABINE to L circumflex w/ plan for staged PCI to RCA.     #Neuro- A&Ox3, stable    #CV- s/p PCI to Lcx w/ planned staging to RCA on 3/14  - started on ASA, Brilinta and Lipitor 80mg   - BP soft with elevated EDP, will wait on starting BB and ACEi   - Had multiple runs of NSVT and was symptomatic so gave 150mg amio, 1gr Mag and then Lidocaine 1mg/kg  - trending Lyudmila   - also had CT C/A/P which was neg for aortic dissection     #Pulm- sating well on RA, stable     #GI- tolerating diet, stable     #Renal- stable, urinating well     #Electrolytes- repleted Phos     #ID- no evidence of active infection     #Endocrine- A1c 5.7, otherwise stable     #PPx- HSQ    Dylan Stewart MD PGY2  [phone] / [phone]

## 2018-03-13 NOTE — H&P ADULT - NSHPSOCIALHISTORY_GEN_ALL_CORE
Social History:    Marital Status:  ( X  )    (   ) Single    (   )    (  )   Occupation: Retired , works as    Lives with: (  ) alone  (  ) children   ( X) spouse   (  ) parents  (  ) other    Substance Use (street drugs): (  ) never used  (  ) other:  Tobacco Usage:  (   ) never smoked   (  X ) former smoker- Quit 30 years ago  (   ) current smoker  (     ) pack year  (        ) last cigarette date  Alcohol Usage: Binge drinks about 8-10 beers 1-2 times a month

## 2018-03-13 NOTE — H&P ADULT - FAMILY HISTORY
Father  Still living? Unknown  Family history of diabetes mellitus, Age at diagnosis: Age Unknown     Mother  Still living? Unknown  Family history of diabetes mellitus, Age at diagnosis: Age Unknown     Sibling  Still living? Unknown  Family history of diabetes mellitus, Age at diagnosis: Age Unknown

## 2018-03-13 NOTE — H&P ADULT - NSHPPHYSICALEXAM_GEN_ALL_CORE
Vital Signs Last 24 Hrs  T(C): 36.7 (13 Mar 2018 03:00), Max: 37.1 (12 Mar 2018 20:40)  T(F): 98.1 (13 Mar 2018 03:00), Max: 98.7 (12 Mar 2018 20:40)  HR: 82 (13 Mar 2018 03:00) (67 - 88)  BP: 108/62 (13 Mar 2018 03:00) (93/59 - 139/91)  BP(mean): 87 (13 Mar 2018 03:00) (71 - 92)  RR: 20 (13 Mar 2018 03:00) (16 - 26)  SpO2: 92% (13 Mar 2018 01:45) (92% - 100%)    PHYSICAL EXAM:    GENERAL: Comfortable, no acute distress   HEAD:  Normocephalic, atraumatic  EYES: EOMI, PERRLA  HEENT: Moist mucous membranes  NECK: Supple, No JVD  NERVOUS SYSTEM:  Alert & Oriented X3, Motor Strength 5/5 B/L upper and lower extremities  CHEST/LUNG: Clear to auscultation bilaterally  HEART: Regular rate and rhythm, no murmur   ABDOMEN: Soft, Nontender, Nondistended, Bowel sounds present  EXTREMITIES:   No clubbing, cyanosis, or edema  MUSCULOSKELETAL: No muscle tenderness, no joint tenderness  SKIN:  warm and dry, no rash, R radial wrist band in place, R femoral sheath in place, areas soft, no hematoma or signs of active bleeding

## 2018-03-14 DIAGNOSIS — I21.21 ST ELEVATION (STEMI) MYOCARDIAL INFARCTION INVOLVING LEFT CIRCUMFLEX CORONARY ARTERY: ICD-10-CM

## 2018-03-14 LAB
ALBUMIN SERPL ELPH-MCNC: 3.8 G/DL — SIGNIFICANT CHANGE UP (ref 3.3–5)
ALP SERPL-CCNC: 89 U/L — SIGNIFICANT CHANGE UP (ref 40–120)
ALT FLD-CCNC: 49 U/L RC — HIGH (ref 10–45)
ANION GAP SERPL CALC-SCNC: 12 MMOL/L — SIGNIFICANT CHANGE UP (ref 5–17)
APTT BLD: 30 SEC — SIGNIFICANT CHANGE UP (ref 27.5–37.4)
AST SERPL-CCNC: 102 U/L — HIGH (ref 10–40)
BASOPHILS # BLD AUTO: 0 K/UL — SIGNIFICANT CHANGE UP (ref 0–0.2)
BASOPHILS NFR BLD AUTO: 0.2 % — SIGNIFICANT CHANGE UP (ref 0–2)
BILIRUB SERPL-MCNC: 0.6 MG/DL — SIGNIFICANT CHANGE UP (ref 0.2–1.2)
BUN SERPL-MCNC: 16 MG/DL — SIGNIFICANT CHANGE UP (ref 7–23)
CALCIUM SERPL-MCNC: 8.9 MG/DL — SIGNIFICANT CHANGE UP (ref 8.4–10.5)
CHLORIDE SERPL-SCNC: 105 MMOL/L — SIGNIFICANT CHANGE UP (ref 96–108)
CK MB BLD-MCNC: 8.6 % — HIGH (ref 0–3.5)
CK MB CFR SERPL CALC: 78.3 NG/ML — HIGH (ref 0–6.7)
CK SERPL-CCNC: 911 U/L — HIGH (ref 30–200)
CO2 SERPL-SCNC: 23 MMOL/L — SIGNIFICANT CHANGE UP (ref 22–31)
CREAT SERPL-MCNC: 1.03 MG/DL — SIGNIFICANT CHANGE UP (ref 0.5–1.3)
EOSINOPHIL # BLD AUTO: 0.1 K/UL — SIGNIFICANT CHANGE UP (ref 0–0.5)
EOSINOPHIL NFR BLD AUTO: 1.5 % — SIGNIFICANT CHANGE UP (ref 0–6)
GLUCOSE SERPL-MCNC: 100 MG/DL — HIGH (ref 70–99)
HCT VFR BLD CALC: 48.6 % — SIGNIFICANT CHANGE UP (ref 39–50)
HGB BLD-MCNC: 16.6 G/DL — SIGNIFICANT CHANGE UP (ref 13–17)
INR BLD: 1.04 RATIO — SIGNIFICANT CHANGE UP (ref 0.88–1.16)
LYMPHOCYTES # BLD AUTO: 1.7 K/UL — SIGNIFICANT CHANGE UP (ref 1–3.3)
LYMPHOCYTES # BLD AUTO: 19.8 % — SIGNIFICANT CHANGE UP (ref 13–44)
MAGNESIUM SERPL-MCNC: 2.2 MG/DL — SIGNIFICANT CHANGE UP (ref 1.6–2.6)
MCHC RBC-ENTMCNC: 32.3 PG — SIGNIFICANT CHANGE UP (ref 27–34)
MCHC RBC-ENTMCNC: 34.2 GM/DL — SIGNIFICANT CHANGE UP (ref 32–36)
MCV RBC AUTO: 94.3 FL — SIGNIFICANT CHANGE UP (ref 80–100)
MONOCYTES # BLD AUTO: 0.7 K/UL — SIGNIFICANT CHANGE UP (ref 0–0.9)
MONOCYTES NFR BLD AUTO: 8.7 % — SIGNIFICANT CHANGE UP (ref 2–14)
NEUTROPHILS # BLD AUTO: 5.9 K/UL — SIGNIFICANT CHANGE UP (ref 1.8–7.4)
NEUTROPHILS NFR BLD AUTO: 69.8 % — SIGNIFICANT CHANGE UP (ref 43–77)
PHOSPHATE SERPL-MCNC: 2.5 MG/DL — SIGNIFICANT CHANGE UP (ref 2.5–4.5)
PLATELET # BLD AUTO: 214 K/UL — SIGNIFICANT CHANGE UP (ref 150–400)
POTASSIUM SERPL-MCNC: 4.1 MMOL/L — SIGNIFICANT CHANGE UP (ref 3.5–5.3)
POTASSIUM SERPL-SCNC: 4.1 MMOL/L — SIGNIFICANT CHANGE UP (ref 3.5–5.3)
PROT SERPL-MCNC: 6.8 G/DL — SIGNIFICANT CHANGE UP (ref 6–8.3)
PROTHROM AB SERPL-ACNC: 11.2 SEC — SIGNIFICANT CHANGE UP (ref 9.8–12.7)
RBC # BLD: 5.16 M/UL — SIGNIFICANT CHANGE UP (ref 4.2–5.8)
RBC # FLD: 12.5 % — SIGNIFICANT CHANGE UP (ref 10.3–14.5)
SODIUM SERPL-SCNC: 140 MMOL/L — SIGNIFICANT CHANGE UP (ref 135–145)
TROPONIN T SERPL-MCNC: 2.35 NG/ML — HIGH (ref 0–0.06)
WBC # BLD: 8.4 K/UL — SIGNIFICANT CHANGE UP (ref 3.8–10.5)
WBC # FLD AUTO: 8.4 K/UL — SIGNIFICANT CHANGE UP (ref 3.8–10.5)

## 2018-03-14 PROCEDURE — 92928 PRQ TCAT PLMT NTRAC ST 1 LES: CPT | Mod: RC

## 2018-03-14 PROCEDURE — 99152 MOD SED SAME PHYS/QHP 5/>YRS: CPT

## 2018-03-14 PROCEDURE — 93306 TTE W/DOPPLER COMPLETE: CPT | Mod: 26

## 2018-03-14 PROCEDURE — 99233 SBSQ HOSP IP/OBS HIGH 50: CPT | Mod: GC

## 2018-03-14 PROCEDURE — 92978 ENDOLUMINL IVUS OCT C 1ST: CPT | Mod: 26,RC

## 2018-03-14 RX ORDER — ACETAMINOPHEN 500 MG
650 TABLET ORAL ONCE
Qty: 0 | Refills: 0 | Status: COMPLETED | OUTPATIENT
Start: 2018-03-14 | End: 2018-03-14

## 2018-03-14 RX ADMIN — Medication 81 MILLIGRAM(S): at 13:17

## 2018-03-14 RX ADMIN — HEPARIN SODIUM 5000 UNIT(S): 5000 INJECTION INTRAVENOUS; SUBCUTANEOUS at 05:18

## 2018-03-14 RX ADMIN — TICAGRELOR 90 MILLIGRAM(S): 90 TABLET ORAL at 17:12

## 2018-03-14 RX ADMIN — ATORVASTATIN CALCIUM 80 MILLIGRAM(S): 80 TABLET, FILM COATED ORAL at 21:10

## 2018-03-14 RX ADMIN — TICAGRELOR 90 MILLIGRAM(S): 90 TABLET ORAL at 05:18

## 2018-03-14 RX ADMIN — Medication 650 MILLIGRAM(S): at 20:21

## 2018-03-14 RX ADMIN — Medication 25 MILLIGRAM(S): at 13:17

## 2018-03-14 RX ADMIN — Medication 25 MILLIGRAM(S): at 21:10

## 2018-03-14 RX ADMIN — Medication 25 MILLIGRAM(S): at 05:18

## 2018-03-14 RX ADMIN — Medication 650 MILLIGRAM(S): at 19:41
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
## 2018-03-14 NOTE — CHART NOTE - NSCHARTNOTEFT_GEN_A_CORE
====================  CCU MIDNIGHT ROUNDS  ====================    SAMY HANSEN  [id number]    ====================  SUMMARY: HPI:  57 y/o M w/ PMHx of GERD p/w chest pain.   Patient reports developing CP on his way home from work around 6:30pm. He describes the pain as a pressure across his entire chest. He reports developing bilateral shoulder pain radiating to his axilla with the chest pain. Also acknowledges generalized weakness.  When he arrived ned he tried lying down, but reports the pain persisted and he became diaphoretic.   His pain didn't improve so he went to [facility] ED.   Of note, the patient reports developing worsening MORRISON over the last few months, especially while ascending stairs.     In the ED,   Given Heparin, ASA and transferred to [facility] for emergent LHC.   Had CT angio chest/A/P- no evidence of aortic dissection (13 Mar 2018 01:20)    ====================        ====================  NEW EVENTS: s/p lido and amio pushes for frequent NSVT, ectopy is much less frequent. No acute events.   ====================        ====================  VITALS (Last 12 hrs):  ====================    T(C): 36.9 (03-13-18 @ 20:32), Max: 36.9 (03-13-18 @ 20:32)  HR: 52 (03-13-18 @ 23:32) (52 - 72)  BP: 102/63 (03-13-18 @ 23:32) (102/63 - 124/76)  BP(mean): 76 (03-13-18 @ 23:32) (75 - 91)  RR: 19 (03-13-18 @ 23:32) (16 - 35)  SpO2: 96% (03-13-18 @ 23:32) (95% - 97%)      TELEMETRY: sinus with frequent unifocal PVCs          I&O's Summary    12 Mar 2018 07:01  -  13 Mar 2018 07:00  --------------------------------------------------------  IN: 350 mL / OUT: 650 mL / NET: -300 mL    13 Mar 2018 07:01  -  14 Mar 2018 00:48  --------------------------------------------------------  IN: 660 mL / OUT: 700 mL / NET: -40 mL        ====================  PLAN:  #STEMI: s/p PCI to circ, access site stable. Frequent NSVT post cath, given amio and lido, asymptomatic and hemodynamically stable. Ectopy is less frequent still w/ PVCs. Plan for staging of the RCA tomorrow. C/w DAPT, lipitor, BB q8. Start ACE post cath. CE are downtrending. TTE prior to d/c. Keep K>4 and Mg>2.    ====================    HEALTH ISSUES - PROBLEM Dx:          Dee Montesinos, CCU [place] #[id number]

## 2018-03-14 NOTE — PROGRESS NOTE ADULT - ASSESSMENT
This is a 58 year old man with past medical history of GERD transfer from [facility] with LAMINE in inferior leads found to have a posterior STEMI went urgently to Cath Lab and is s/p SABINE to totally occluded LCx. Patient having runs of NSVT controled with Lidocaine and Amio Boluses started on Lopressor with better control og PVCs. Patient for staged PCI to RCA (7%). Mulitple episodes of NSVT.

## 2018-03-14 NOTE — CHART NOTE - NSCHARTNOTEFT_GEN_A_CORE
====================  CCU MIDNIGHT ROUNDS  ====================    SAMY HANSEN  [id number]  Patient is a 58y old  Male who presents with a chief complaint of Chest pain (13 Mar 2018 01:20)      ====================  SUMMARY:  ====================      ====================  NEW EVENTS:  ====================    MEDICATIONS  (STANDING):  aspirin enteric coated 81 milliGRAM(s) Oral daily  atorvastatin 80 milliGRAM(s) Oral at bedtime  heparin  Injectable 5000 Unit(s) SubCutaneous every 12 hours  influenza   Vaccine 0.5 milliLiter(s) IntraMuscular once  metoprolol     tartrate 25 milliGRAM(s) Oral every 8 hours  ticagrelor 90 milliGRAM(s) Oral two times a day    MEDICATIONS  (PRN):    ====================  VITALS (Last 12 hrs):  ====================    T(C): 37 (03-14-18 @ 19:00), Max: 37 (03-14-18 @ 19:00)  T(F): 98.6 (03-14-18 @ 19:00), Max: 98.6 (03-14-18 @ 19:00)  HR: 62 (03-14-18 @ 22:00) (58 - 72)  BP: 130/66 (03-14-18 @ 22:00) (96/69 - 130/66)  BP(mean): 100 (03-14-18 @ 22:00) (72 - 100)  ABP: --  ABP(mean): --  RR: 19 (03-14-18 @ 22:00) (9 - 21)  SpO2: 93% (03-14-18 @ 22:00) (93% - 98%)  Wt(kg): --  CVP(mm Hg): --  CVP(cm H2O): --  CO: --  CI: --  PA: --  PA(mean): --  PCWP: --  SVR: --  PVR: --    I&O's Summary    13 Mar 2018 07:01  -  14 Mar 2018 07:00  --------------------------------------------------------  IN: 860 mL / OUT: 1600 mL / NET: -740 mL    14 Mar 2018 07:01  -  14 Mar 2018 22:46  --------------------------------------------------------  IN: 180 mL / OUT: 500 mL / NET: -320 mL    ====================  NEW LABS:  ====================    03-14    140  |  105  |  16  ----------------------------<  100<H>  4.1   |  23  |  1.03    Ca    8.9      14 Mar 2018 02:52  Phos  2.5     03-14  Mg     2.2     03-14    TPro  6.8  /  Alb  3.8  /  TBili  0.6  /  DBili  x   /  AST  102<H>  /  ALT  49<H>  /  AlkPhos  89  03-14    PT/INR - ( 14 Mar 2018 02:52 )   PT: 11.2 sec;   INR: 1.04 ratio       PTT - ( 14 Mar 2018 02:52 )  PTT:30.0 sec  Troponin T, Serum: 2.35 ng/mL <H> (03-14-18 @ 02:52)  Creatine Kinase, Serum: 911 U/L <H> (03-14-18 @ 02:52)    CKMB Units: 78.3 ng/mL (03-14 @ 02:52)  ====================  PLAN:  ====================  -       Rebecca LUGOU NP  Beeper #[id number]  Spectra # [phone] ====================  CCU MIDNIGHT ROUNDS  ====================    SAMY HANSEN  [id number]  Patient is a 58y old  Male who presents with a chief complaint of Chest pain (13 Mar 2018 01:20)    ====================  SUMMARY:  ====================  57 y/o male with no significant medical history except GERD transferred from [facility] [place] for posterior wall STEMI.  Now s/p PCI with SABINE to LCx (100%) and staged PCI with SABINE to RCA.    ====================  NEW EVENTS:  ====================  No further c/o CP. No ventricular arrhythmias.    MEDICATIONS  (STANDING):  aspirin enteric coated 81 milliGRAM(s) Oral daily  atorvastatin 80 milliGRAM(s) Oral at bedtime  heparin  Injectable 5000 Unit(s) SubCutaneous every 12 hours  influenza   Vaccine 0.5 milliLiter(s) IntraMuscular once  metoprolol     tartrate 25 milliGRAM(s) Oral every 8 hours  ticagrelor 90 milliGRAM(s) Oral two times a day    MEDICATIONS  (PRN):    ====================  VITALS (Last 12 hrs):  ====================    T(C): 37 (03-14-18 @ 19:00), Max: 37 (03-14-18 @ 19:00)  T(F): 98.6 (03-14-18 @ 19:00), Max: 98.6 (03-14-18 @ 19:00)  HR: 62 (03-14-18 @ 22:00) (58 - 72)  BP: 130/66 (03-14-18 @ 22:00) (96/69 - 130/66)  BP(mean): 100 (03-14-18 @ 22:00) (72 - 100)  ABP: --  ABP(mean): --  RR: 19 (03-14-18 @ 22:00) (9 - 21)  SpO2: 93% (03-14-18 @ 22:00) (93% - 98%)  Wt(kg): --  CVP(mm Hg): --  CVP(cm H2O): --  CO: --  CI: --  PA: --  PA(mean): --  PCWP: --  SVR: --  PVR: --    I&O's Summary    13 Mar 2018 07:01  -  14 Mar 2018 07:00  --------------------------------------------------------  IN: 860 mL / OUT: 1600 mL / NET: -740 mL    14 Mar 2018 07:01  -  14 Mar 2018 22:46  --------------------------------------------------------  IN: 180 mL / OUT: 500 mL / NET: -320 mL    ====================  NEW LABS:  ====================    03-14    140  |  105  |  16  ----------------------------<  100<H>  4.1   |  23  |  1.03    Ca    8.9      14 Mar 2018 02:52  Phos  2.5     03-14  Mg     2.2     03-14    TPro  6.8  /  Alb  3.8  /  TBili  0.6  /  DBili  x   /  AST  102<H>  /  ALT  49<H>  /  AlkPhos  89  03-14    PT/INR - ( 14 Mar 2018 02:52 )   PT: 11.2 sec;   INR: 1.04 ratio       PTT - ( 14 Mar 2018 02:52 )  PTT:30.0 sec  Troponin T, Serum: 2.35 ng/mL <H> (03-14-18 @ 02:52)  Creatine Kinase, Serum: 911 U/L <H> (03-14-18 @ 02:52)    CKMB Units: 78.3 ng/mL (03-14 @ 02:52)  ====================  PLAN:  ====================  - 57 y/o male presented wit posterior wall STEMi s/p PCI with SABINE to LCx and RCA.  - Continue DAPT, BB, and high-intensity statin  - Start ACEI when BP tolerates  - TTE shows EF 42% hypokinetic basal and midinferior, inferolateral, and lateral walls.  - DVT ppx  - Activity as tolerated    Rebecca Gaytan CCU NP  Beeper #[id number]  Spectra # [phone]

## 2018-03-14 NOTE — PROGRESS NOTE ADULT - PROBLEM SELECTOR PLAN 1
and involving Posterior Wall s/p CATH SABINE to LCX, EDP 38 Cath EF 40%  No JVD, No HJR Net Negative 800mL  ASA, Brilinta, Lopressor 25mg TID, Lipitor 80mg  ACE-i when able tolerated  DASH Diet  Lifestyles changes

## 2018-03-15 ENCOUNTER — TRANSCRIPTION ENCOUNTER (OUTPATIENT)
Age: 58
End: 2018-03-15

## 2018-03-15 VITALS
SYSTOLIC BLOOD PRESSURE: 91 MMHG | OXYGEN SATURATION: 98 % | DIASTOLIC BLOOD PRESSURE: 61 MMHG | RESPIRATION RATE: 18 BRPM | HEART RATE: 70 BPM

## 2018-03-15 LAB
ALBUMIN SERPL ELPH-MCNC: 3.7 G/DL — SIGNIFICANT CHANGE UP (ref 3.3–5)
ALP SERPL-CCNC: 95 U/L — SIGNIFICANT CHANGE UP (ref 40–120)
ALT FLD-CCNC: 39 U/L RC — SIGNIFICANT CHANGE UP (ref 10–45)
ANION GAP SERPL CALC-SCNC: 13 MMOL/L — SIGNIFICANT CHANGE UP (ref 5–17)
AST SERPL-CCNC: 51 U/L — HIGH (ref 10–40)
BASOPHILS # BLD AUTO: 0.1 K/UL — SIGNIFICANT CHANGE UP (ref 0–0.2)
BASOPHILS NFR BLD AUTO: 0.7 % — SIGNIFICANT CHANGE UP (ref 0–2)
BILIRUB SERPL-MCNC: 0.6 MG/DL — SIGNIFICANT CHANGE UP (ref 0.2–1.2)
BUN SERPL-MCNC: 19 MG/DL — SIGNIFICANT CHANGE UP (ref 7–23)
CALCIUM SERPL-MCNC: 9 MG/DL — SIGNIFICANT CHANGE UP (ref 8.4–10.5)
CHLORIDE SERPL-SCNC: 105 MMOL/L — SIGNIFICANT CHANGE UP (ref 96–108)
CO2 SERPL-SCNC: 23 MMOL/L — SIGNIFICANT CHANGE UP (ref 22–31)
CREAT SERPL-MCNC: 1.05 MG/DL — SIGNIFICANT CHANGE UP (ref 0.5–1.3)
EOSINOPHIL # BLD AUTO: 0.1 K/UL — SIGNIFICANT CHANGE UP (ref 0–0.5)
EOSINOPHIL NFR BLD AUTO: 1.4 % — SIGNIFICANT CHANGE UP (ref 0–6)
GLUCOSE SERPL-MCNC: 102 MG/DL — HIGH (ref 70–99)
HCT VFR BLD CALC: 49.8 % — SIGNIFICANT CHANGE UP (ref 39–50)
HGB BLD-MCNC: 16.9 G/DL — SIGNIFICANT CHANGE UP (ref 13–17)
LYMPHOCYTES # BLD AUTO: 1.5 K/UL — SIGNIFICANT CHANGE UP (ref 1–3.3)
LYMPHOCYTES # BLD AUTO: 17.2 % — SIGNIFICANT CHANGE UP (ref 13–44)
MAGNESIUM SERPL-MCNC: 2.2 MG/DL — SIGNIFICANT CHANGE UP (ref 1.6–2.6)
MCHC RBC-ENTMCNC: 31.6 PG — SIGNIFICANT CHANGE UP (ref 27–34)
MCHC RBC-ENTMCNC: 33.9 GM/DL — SIGNIFICANT CHANGE UP (ref 32–36)
MCV RBC AUTO: 93.1 FL — SIGNIFICANT CHANGE UP (ref 80–100)
MONOCYTES # BLD AUTO: 0.9 K/UL — SIGNIFICANT CHANGE UP (ref 0–0.9)
MONOCYTES NFR BLD AUTO: 9.8 % — SIGNIFICANT CHANGE UP (ref 2–14)
NEUTROPHILS # BLD AUTO: 6.2 K/UL — SIGNIFICANT CHANGE UP (ref 1.8–7.4)
NEUTROPHILS NFR BLD AUTO: 71 % — SIGNIFICANT CHANGE UP (ref 43–77)
PHOSPHATE SERPL-MCNC: 2.9 MG/DL — SIGNIFICANT CHANGE UP (ref 2.5–4.5)
PLATELET # BLD AUTO: 212 K/UL — SIGNIFICANT CHANGE UP (ref 150–400)
POTASSIUM SERPL-MCNC: 4.3 MMOL/L — SIGNIFICANT CHANGE UP (ref 3.5–5.3)
POTASSIUM SERPL-SCNC: 4.3 MMOL/L — SIGNIFICANT CHANGE UP (ref 3.5–5.3)
PROT SERPL-MCNC: 7.1 G/DL — SIGNIFICANT CHANGE UP (ref 6–8.3)
RBC # BLD: 5.34 M/UL — SIGNIFICANT CHANGE UP (ref 4.2–5.8)
RBC # FLD: 12.8 % — SIGNIFICANT CHANGE UP (ref 10.3–14.5)
SODIUM SERPL-SCNC: 141 MMOL/L — SIGNIFICANT CHANGE UP (ref 135–145)
WBC # BLD: 8.8 K/UL — SIGNIFICANT CHANGE UP (ref 3.8–10.5)
WBC # FLD AUTO: 8.8 K/UL — SIGNIFICANT CHANGE UP (ref 3.8–10.5)

## 2018-03-15 PROCEDURE — 84484 ASSAY OF TROPONIN QUANT: CPT

## 2018-03-15 PROCEDURE — 86850 RBC ANTIBODY SCREEN: CPT

## 2018-03-15 PROCEDURE — 84443 ASSAY THYROID STIM HORMONE: CPT

## 2018-03-15 PROCEDURE — 85730 THROMBOPLASTIN TIME PARTIAL: CPT

## 2018-03-15 PROCEDURE — 93005 ELECTROCARDIOGRAM TRACING: CPT

## 2018-03-15 PROCEDURE — C1874: CPT

## 2018-03-15 PROCEDURE — 93458 L HRT ARTERY/VENTRICLE ANGIO: CPT | Mod: 59

## 2018-03-15 PROCEDURE — 86901 BLOOD TYPING SEROLOGIC RH(D): CPT

## 2018-03-15 PROCEDURE — 92978 ENDOLUMINL IVUS OCT C 1ST: CPT | Mod: RC

## 2018-03-15 PROCEDURE — 84100 ASSAY OF PHOSPHORUS: CPT

## 2018-03-15 PROCEDURE — 99153 MOD SED SAME PHYS/QHP EA: CPT

## 2018-03-15 PROCEDURE — C1725: CPT

## 2018-03-15 PROCEDURE — C1887: CPT

## 2018-03-15 PROCEDURE — 86900 BLOOD TYPING SEROLOGIC ABO: CPT

## 2018-03-15 PROCEDURE — C9606: CPT | Mod: LC

## 2018-03-15 PROCEDURE — C9600: CPT | Mod: RC

## 2018-03-15 PROCEDURE — 99233 SBSQ HOSP IP/OBS HIGH 50: CPT

## 2018-03-15 PROCEDURE — C1769: CPT

## 2018-03-15 PROCEDURE — 82550 ASSAY OF CK (CPK): CPT

## 2018-03-15 PROCEDURE — 85027 COMPLETE CBC AUTOMATED: CPT

## 2018-03-15 PROCEDURE — 80061 LIPID PANEL: CPT

## 2018-03-15 PROCEDURE — 85610 PROTHROMBIN TIME: CPT

## 2018-03-15 PROCEDURE — 83735 ASSAY OF MAGNESIUM: CPT

## 2018-03-15 PROCEDURE — 93306 TTE W/DOPPLER COMPLETE: CPT

## 2018-03-15 PROCEDURE — 82553 CREATINE MB FRACTION: CPT

## 2018-03-15 PROCEDURE — C1894: CPT

## 2018-03-15 PROCEDURE — C1753: CPT

## 2018-03-15 PROCEDURE — 93010 ELECTROCARDIOGRAM REPORT: CPT

## 2018-03-15 PROCEDURE — 80053 COMPREHEN METABOLIC PANEL: CPT

## 2018-03-15 PROCEDURE — 99152 MOD SED SAME PHYS/QHP 5/>YRS: CPT

## 2018-03-15 PROCEDURE — 83036 HEMOGLOBIN GLYCOSYLATED A1C: CPT

## 2018-03-15 RX ORDER — ATORVASTATIN CALCIUM 80 MG/1
1 TABLET, FILM COATED ORAL
Qty: 30 | Refills: 0
Start: 2018-03-15 | End: 2018-04-13

## 2018-03-15 RX ORDER — METOPROLOL TARTRATE 50 MG
75 TABLET ORAL DAILY
Qty: 0 | Refills: 0 | Status: DISCONTINUED | OUTPATIENT
Start: 2018-03-15 | End: 2018-03-15

## 2018-03-15 RX ORDER — LISINOPRIL 2.5 MG/1
1 TABLET ORAL
Qty: 30 | Refills: 0
Start: 2018-03-15 | End: 2018-04-13

## 2018-03-15 RX ORDER — ASPIRIN/CALCIUM CARB/MAGNESIUM 324 MG
1 TABLET ORAL
Qty: 30 | Refills: 0
Start: 2018-03-15 | End: 2018-04-13

## 2018-03-15 RX ORDER — METOPROLOL TARTRATE 50 MG
3 TABLET ORAL
Qty: 90 | Refills: 0 | OUTPATIENT
Start: 2018-03-15 | End: 2018-04-13

## 2018-03-15 RX ORDER — METOPROLOL TARTRATE 50 MG
2 TABLET ORAL
Qty: 0 | Refills: 0 | COMMUNITY
Start: 2018-03-15 | End: 2018-04-13

## 2018-03-15 RX ORDER — TICAGRELOR 90 MG/1
1 TABLET ORAL
Qty: 60 | Refills: 11 | OUTPATIENT
Start: 2018-03-15 | End: 2019-03-09

## 2018-03-15 RX ORDER — LISINOPRIL 2.5 MG/1
2.5 TABLET ORAL DAILY
Qty: 0 | Refills: 0 | Status: DISCONTINUED | OUTPATIENT
Start: 2018-03-15 | End: 2018-03-15

## 2018-03-15 RX ADMIN — Medication 75 MILLIGRAM(S): at 05:04

## 2018-03-15 RX ADMIN — TICAGRELOR 90 MILLIGRAM(S): 90 TABLET ORAL at 05:04

## 2018-03-15 RX ADMIN — Medication 81 MILLIGRAM(S): at 11:18

## 2018-03-15 RX ADMIN — LISINOPRIL 2.5 MILLIGRAM(S): 2.5 TABLET ORAL at 05:03

## 2018-03-15 RX ADMIN — HEPARIN SODIUM 5000 UNIT(S): 5000 INJECTION INTRAVENOUS; SUBCUTANEOUS at 05:04

## 2018-03-15 NOTE — PROGRESS NOTE ADULT - ATTENDING COMMENTS
Patient seen and examined. Agree with assessment and plan as outlined above.  59 yo M with inferior/posterior STEMI s/p PCI to totally occluded LCx. Patient for staged PCI to RCA. Mulitple episodes of NSVT. Patient given IV amiodarone/IV labetalol. Start metoprolol 25 mg q8hr. Consider IABP if recurrent VT. Continue CCU care.
Patient is seen and examined with fellow, NP and the CCU house-staff. I agree with the history, physical and the assessment and plan.  staged PCI today
Patient seen and examined. Agree with assessment and plan as outlined above.  59 yo M with posterior wall MI s/p LCx SABINE placement with NSVT following cardiac cath. Patient now status-post staged PCI to RCA. Moderate LV dysfunction with EF~40% on TTE. Patient on aspirin, brilinta, ace-inhibitor, Toprol XL, statin. Discharge planning after ambulates. Follow-up in the office~1 week.

## 2018-03-15 NOTE — DIETITIAN INITIAL EVALUATION ADULT. - ORAL INTAKE PTA
Patient endorsed a good appetite & PO intake PTA. States he and his wife do the cooking/shopping at home. Diet recall obtained, usual intake reveal intake of fatty/salty foods and may include cheese, steak, adobo seasoning/sazon, minimal fruits/vegetables, juice, occasionally ice cream, etc./good

## 2018-03-15 NOTE — PROGRESS NOTE ADULT - SUBJECTIVE AND OBJECTIVE BOX
CHIEF COMPLAINT::    INTERVAL HISTORY:    REVIEW OF SYSTEMS: all others negative    MEDICATIONS  (STANDING):  aspirin enteric coated 81 milliGRAM(s) Oral daily  atorvastatin 80 milliGRAM(s) Oral at bedtime  heparin  Injectable 5000 Unit(s) SubCutaneous every 12 hours  influenza   Vaccine 0.5 milliLiter(s) IntraMuscular once  lisinopril 2.5 milliGRAM(s) Oral daily  metoprolol succinate ER 75 milliGRAM(s) Oral daily  ticagrelor 90 milliGRAM(s) Oral two times a day    MEDICATIONS  (PRN):      Objective:  Vital Signs Last 24 Hrs  T(C): 36.6 (15 Mar 2018 05:00), Max: 37 (14 Mar 2018 19:00)  T(F): 97.8 (15 Mar 2018 05:00), Max: 98.6 (14 Mar 2018 19:00)  HR: 62 (15 Mar 2018 06:00) (56 - 72)  BP: 105/75 (15 Mar 2018 06:00) (90/53 - 130/66)  BP(mean): 88 (15 Mar 2018 06:00) (65 - 100)  RR: 18 (15 Mar 2018 05:00) (7 - 21)  SpO2: 93% (14 Mar 2018 22:00) (93% - 98%)  ICU Vital Signs Last 24 Hrs  T(C): 36.6 (15 Mar 2018 05:00), Max: 37 (14 Mar 2018 19:00)  T(F): 97.8 (15 Mar 2018 05:00), Max: 98.6 (14 Mar 2018 19:00)  HR: 62 (15 Mar 2018 06:00) (56 - 72)  BP: 105/75 (15 Mar 2018 06:00) (90/53 - 130/66)  BP(mean): 88 (15 Mar 2018 06:00) (65 - 100)  ABP: --  ABP(mean): --  RR: 18 (15 Mar 2018 05:00) (7 - 21)  SpO2: 93% (14 Mar 2018 22:00) (93% - 98%)      Adult Advanced Hemodynamics Last 24 Hrs  CVP(mm Hg): --  CVP(cm H2O): --  CO: --  CI: --  PA: --  PA(mean): --  PCWP: --  SVR: --  SVRI: --  PVR: --  PVRI: --      03-14 @ 07:01  -  03-15 @ 07:00  --------------------------------------------------------  IN: 300 mL / OUT: 500 mL / NET: -200 mL      Daily     Daily Weight in k.5 (15 Mar 2018 05:00)    PHYSICAL EXAM:      Constitutional:    Eyes:    ENMT:    Neck:    Breasts:    Back:    Respiratory:    Cardiovascular:    Gastrointestinal:    Genitourinary:    Rectal:    Extremities:    Vascular:    Neurological:    Skin:    Lymph Nodes:    Musculoskeletal:    Psychiatric:        TELEMETRY:     EKG:     CARDIAC CATH:     ECHO:    IMAGIN.9   8.8   )-----------( 212      ( 15 Mar 2018 04:54 )             49.8     03-15    141  |  105  |  19  ----------------------------<  102<H>  4.3   |  23  |  1.05    Ca    9.0      15 Mar 2018 04:54  Phos  2.9     03-15  Mg     2.2     03-15    TPro  7.1  /  Alb  3.7  /  TBili  0.6  /  DBili  x   /  AST  51<H>  /  ALT  39  /  AlkPhos  95  03-15    LIVER FUNCTIONS - ( 15 Mar 2018 04:54 )  Alb: 3.7 g/dL / Pro: 7.1 g/dL / ALK PHOS: 95 U/L / ALT: 39 U/L RC / AST: 51 U/L / GGT: x           PT/INR - ( 14 Mar 2018 02:52 )   PT: 11.2 sec;   INR: 1.04 ratio         PTT - ( 14 Mar 2018 02:52 )  PTT:30.0 sec      *BLOOD GAS/ARTERIAL/MIXED/VENOUS  *LACTATE      HEALTH ISSUES - PROBLEM Dx:  STEMI involving left circumflex coronary artery: STEMI involving left circumflex coronary artery        HEALTH ISSUES - R/O PROBLEM Dx:      Rodolfo Garrido, CCU NP   # CHIEF COMPLAINT::  Posterior wall STEMI  INTERVAL HISTORY:  This is a 58 year old man with past medical history of GERD transfer from [facility] with LAMINE in inferior leads found to have a posterior STEMI went urgently to Cath Lab and is s/p SABINE to totally occluded LCx. Patient having runs of NSVT controlled with Lidocaine and Amio Boluses started on Lopressor with better control of PVCs. Patient for staged PCI to RCA (7%). Mulitple episodes of NSVT. Patient received PCI to RCA through radial artery. No hematoma at access site. This morning patient c/o SOB without CP or cough.     REVIEW OF SYSTEMS: c/o SOB without CP or cough. All others negative.      MEDICATIONS  (STANDING):  aspirin enteric coated 81 milliGRAM(s) Oral daily  atorvastatin 80 milliGRAM(s) Oral at bedtime  heparin  Injectable 5000 Unit(s) SubCutaneous every 12 hours  influenza   Vaccine 0.5 milliLiter(s) IntraMuscular once  lisinopril 2.5 milliGRAM(s) Oral daily  metoprolol succinate ER 75 milliGRAM(s) Oral daily  ticagrelor 90 milliGRAM(s) Oral two times a day    MEDICATIONS  (PRN):      Objective:  Vital Signs Last 24 Hrs  T(C): 36.6 (15 Mar 2018 05:00), Max: 37 (14 Mar 2018 19:00)  T(F): 97.8 (15 Mar 2018 05:00), Max: 98.6 (14 Mar 2018 19:00)  HR: 62 (15 Mar 2018 06:00) (56 - 72)  BP: 105/75 (15 Mar 2018 06:00) (90/53 - 130/66)  BP(mean): 88 (15 Mar 2018 06:00) (65 - 100)  RR: 18 (15 Mar 2018 05:00) (7 - 21)  SpO2: 93% (14 Mar 2018 22:00) (93% - 98%)  ICU Vital Signs Last 24 Hrs  T(C): 36.6 (15 Mar 2018 05:00), Max: 37 (14 Mar 2018 19:00)  T(F): 97.8 (15 Mar 2018 05:00), Max: 98.6 (14 Mar 2018 19:00)  HR: 62 (15 Mar 2018 06:00) (56 - 72)  BP: 105/75 (15 Mar 2018 06:00) (90/53 - 130/66)  BP(mean): 88 (15 Mar 2018 06:00) (65 - 100)  ABP: --  ABP(mean): --  RR: 18 (15 Mar 2018 05:00) (7 - 21)  SpO2: 93% (14 Mar 2018 22:00) (93% - 98%)      Adult Advanced Hemodynamics Last 24 Hrs  CVP(mm Hg): --  CVP(cm H2O): --  CO: --  CI: --  PA: --  PA(mean): --  PCWP: --  SVR: --  SVRI: --  PVR: --  PVRI: --       @ 07:01  -  03-15 @ 07:00  --------------------------------------------------------  IN: 300 mL / OUT: 500 mL / NET: -200 mL      Daily     Daily Weight in k.5 (15 Mar 2018 05:00)    PHYSICAL EXAM:    Constitutional: Pt laying in bed, comfortable, in NAD.    Respiratory: CTA all fields b/l without rales, wheezes, rhonchi.    Cardiovascular: No JVD. Normal S1/S2, RRR. No hepatojugular reflux.    Gastrointestinal: +BS. Soft, NT/ND.    Extremities: 2+ radial and pedal pulses b/l. No edema.    Neurological: A&O x4.      TELEMETRY: Sinus rhythm, rare PVC.    EKG:     CARDIAC CATH:   < from: Cardiac Cath Lab - Adult (18 @ 22:00) >    [facility]  Department of Cardiology  [address] [place] [address]  [phone]  Cath Lab Report -- Comprehensive Report  Patient: SAMY HANSEN  Study date: 2018  Account number: [id number]  MR number: [id number]  : 1960  Gender: Male  Race: W  Case Physician(s):  ERIC Plaza M.D.  Fellow:  Laina Khan D.O.  Referring Physician:  INDICATIONS: Initial STEMI.  HISTORY: There was no prior cardiac history. There were no significant risk  factors.  PROCEDURE:  --  Left heart catheterization with ventriculography.  --  Right coronary angiography.  --  Left coronary angiography.  --  Intervention on mid circumflex: balloon angioplasty, stent.  TECHNIQUE: Oxygen 2 L/min. The risks and alternatives of the procedures and  conscious sedation were explained to the patient and informed consent was  obtained. Cardiac catheterization performed emergently. Coronary  intervention performed emergently.  Right radial artery access. Local anesthetic given. The puncture site was  infiltrated with 2 % lidocaine. A 6FR PRELUDE KIT was inserted in the  vessel. Left heart catheterization. Ventriculography was performed. A 5FR  FR4.0 EXPO catheter was utilized. After recording ascending aortic  pressure, the catheter was advanced across the aortic valve and left  ventricular pressure was recorded. Right coronary artery angiography. The  vessel was injected utilizing a 5FR FR4.0 EXPO catheter. Right femoral  artery access. Local anesthetic given. The puncture site was infiltrated  with 2 % lidocaine. A 6FR SHEATH PINNACLE was inserted in the vessel. Left  coronary artery angiography. The vessel was injected utilizing a 6FR JL4  LAUNCHER catheter. RADIATION EXPOSURE: 14.1 min. A successful balloon  angioplasty with stent was performed on the 100 % lesion in the mid  circumflex. Following intervention there was a 1 % residual stenosis.  According to the ACC/AHA classification system, this lesion was a type C  lesion. There was RODRICK 0 flow before the procedure and RODRICK 3 flow after  the procedure. There were no site complications. Vessel setup was  performed. A 6FR JL4 LAUNCHER guiding catheter was used to intubate the  vessel. A DAWNA [id number] wire was used to cross the lesion. Balloon  angioplasty was performed, using a 2.50 X 15 APEX balloon, with 1  inflations and a maximum inflation pressure of 8 lorelei. A 3.00 X 38 SYNERGY  drug-eluting stent was placed across the lesion and deployed at a maximum  inflation pressure of 16 lorelei. Balloon angioplasty was performed, using a  3.50 X 20 EUPHORA NC balloon, with 3 inflations and a maximum inflation  pressure of 22 lorelei.  CONTRAST GIVEN: Omnipaque 40 ml. Omnipaque 70 ml.  MEDICATIONS GIVEN: Verapamil (Isoptin, Calan, Covera), 2.5 mg, IA.  Verapamil (Isoptin, Calan, Covera), 2.5 mg, IA. Atropine, 0.5 mg, IV.  Heparin, 5000 units, IV. Heparin, 2000 units, IV. Cardene, 125 mcg.  Integrilin (2 mg/mL), 8.5 ml.  VENTRICLES: Analysis of regional contractile function demonstrated severe  diaphragmatic hypokinesis. Global left ventricular function was moderately  depressed. EF estimated was 40 %.  CORONARY VESSELS: The coronary circulation is right dominant.  LM:   --  LM: Normal.  LAD:   --  LAD: Angiography showed minorluminal irregularities with no  flow limiting lesions.  --  Proximal LAD: There was a discrete 30 % stenosis.  --  Mid LAD: There was a discrete 40 % stenosis.  --  D2: The vessel was very small sized. There was a discrete 80 % stenosis  in the proximal third of the vessel segment. There was RODRICK grade 3 flow  through the vessel (brisk flow).  CX:   --  Mid circumflex: There was a 100 % stenosis.  RCA:   --  Proximal RCA: There was a discrete 70 % stenosis. There was RODRICK  grade 3 flow through the vessel (brisk flow).  COMPLICATIONS: There were no complications.  SUMMARY:  1ST LESION INTERVENTIONS: A successful balloon angioplasty with stent was  performed on the 100 % lesion in the mid circumflex. Following  intervention there was a 1 % residual stenosis.  DIAGNOSTIC RECOMMENDATIONS: PCI of mid Cx for treatment of inferior STEMI.  INTERVENTIONAL RECOMMENDATIONS: Add aspirin, 81 mg, PO, daily. Add  ticagrelor 90 mg twice daily. Patient management should include aggressive  medical therapy.  Prepared and signed by  Scott Dolan M.D.  Signed 2018 23:18:53  HEMODYNAMIC TABLES  Pressures:  Baseline  Pressures:  - HR: 83  Pressures:  - Rhythm:  Pressures:  -- Aortic Pressure (S/D/M): 131/88/106  Pressures:  -- Left Ventricle (s/edp): 133/38/--  Pressures:  Intervention  Pressures:  - HR: 96  Pressures:  - Rhythm:< from: Transthoracic Echocardiogram (18 @ 08:01) >    Patient name: SAMY HANSEN  YOB: 1960   Age: 58 (M)   MR#: [id number]  Study Date: 3/14/2018  Location: [place] [id number]: Danielito Denton RDCS  Study quality: Technically good  Referring Physician: Scott Dolan MD  Blood Pressure: 84/57 mmHg  Height: 175 cm  Weight: 93 kg  BSA: 2.1 m2  ------------------------------------------------------------------------  PROCEDURE: Transthoracic echocardiogram with 2-D, M-Mode  and complete spectral and color flow Doppler.  INDICATION: Abnormal electrocardiogram (ECG) (EKG)  (R94.31), Atherosclerotic heart disease of native coronary  artery without angina pectoris (I25.10)  ------------------------------------------------------------------------  Dimensions:    Normal Values:  LA:     4.0    2.0 - 4.0 cm  Ao:     3.4    2.0 - 3.8 cm  SEPTUM: 1.1    0.6 - 1.2 cm  PWT:    0.9    0.6 - 1.1 cm  LVIDd:  4.9    3.0 - 5.6 cm  LVIDs:  3.9    1.8 - 4.0 cm  Derived variables:  LVMI: 85 g/m2  RWT: 0.36  Fractional short: 20 %  EF (Sivakumaricholtz): 42 %  ------------------------------------------------------------------------  Observations:  Mitral Valve: Mitral annular calcification, otherwise  normal mitral valve. Mild mitral regurgitation.  Aortic Valve/Aorta: Normal trileaflet aortic valve.  Normal aortic root (Ao: 3.4 cm at the sinuses of Valsalva).  Left Atrium: Normal left atrium.  LA volume index = 30  cc/m2.  Left Ventricle: Moderate segmental left ventricular  systolic dysfunction.  The basal to mid inferior,  inferolateral, and lateral walls are severely hypokinetic.  Endocardial visualization enhanced with intravenous  injection of echo contrast (Definity). No left ventricular  thrombus. Normal left ventricular internal dimensions and  wall thicknesses. Normal diastolic function  Right Heart: Normal right atrium. Normal right ventricular  size and function. Normal tricuspid valve. Mild tricuspid  regurgitation. Pulmonic valve not well visualized.  Pericardium/Pleura: Normal pericardium with no pericardial  effusion.  Hemodynamic: Estimated right atrial pressure is 8 mm Hg.  ------------------------------------------------------------------------  Conclusions:  1. Normal left ventricular internal dimensions and wall  thicknesses.  2. Moderate segmental leftventricular systolic  dysfunction.  The basal to mid inferior, inferolateral, and  lateral walls are severely hypokinetic.  Endocardial  visualization enhanced with intravenous injection of echo  contrast (Definity). No left ventricular thrombus.  3.Normal right ventricular size and function.  4. Estimated pulmonary artery systolic pressure equals 33  mm Hg, assuming right atrial pressure equals 8  mm Hg,  consistent with normal pulmonary pressures.  *** No previous Echo exam.  ------------------------------------------------------------------------  Confirmed on  3/14/2018 - 11:30:13 by Tavon Corona M.D.  ------------------------------------------------------------------------    < end of copied text >    Pressures:  -- Aortic Pressure (S/D/M): 104/74/88  Outputs:  Baseline  Outputs:  -- CALCULATIONS[age]  Outputs:  -- CALCULATIONS: Body Surface Area: 2.08  Outputs:  -- CALCULATIONS: Height in cm: 178.00  Outputs:  -- CALCULATIONS: Sex: Male  Outputs:  -- CALCULATIONS: Weight in k.00    < end of copied text >    ECHO:      IMAGIN.9   8.8   )-----------( 212      ( 15 Mar 2018 04:54 )             49.8     03-15    141  |  105  |  19  ----------------------------<  102<H>  4.3   |  23  |  1.05    Ca    9.0      15 Mar 2018 04:54  Phos  2.9     -15  Mg     2.2     -15    TPro  7.1  /  Alb  3.7  /  TBili  0.6  /  DBili  x   /  AST  51<H>  /  ALT  39  /  AlkPhos  95  03-15    LIVER FUNCTIONS - ( 15 Mar 2018 04:54 )  Alb: 3.7 g/dL / Pro: 7.1 g/dL / ALK PHOS: 95 U/L / ALT: 39 U/L RC / AST: 51 U/L / GGT: x           PT/INR - ( 14 Mar 2018 02:52 )   PT: 11.2 sec;   INR: 1.04 ratio         PTT - ( 14 Mar 2018 02:52 )  PTT:30.0 sec      *BLOOD GAS/ARTERIAL/MIXED/VENOUS  *LACTATE      HEALTH ISSUES - PROBLEM Dx:  STEMI involving left circumflex coronary artery: STEMI involving left circumflex coronary artery        HEALTH ISSUES - R/O PROBLEM Dx:      BRITTNEY SunU NP   #

## 2018-03-15 NOTE — PROGRESS NOTE ADULT - ASSESSMENT
This is a 58 year old man with past medical history of GERD transfer from [facility] with LAMINE in inferior leads found to have a posterior STEMI went urgently to Cath Lab and is s/p SABINE to totally occluded LCx. Patient having runs of NSVT controlled with Lidocaine and Amio Boluses started on Lopressor with better control of PVCs. Patient for staged PCI to RCA (7%). Mulitple episodes of NSVT. Patient received PCI to RCA through radial artery. No hematoma at access site. This morning patient c/o SOB without CP or cough.

## 2018-03-15 NOTE — DISCHARGE NOTE ADULT - MEDICATION SUMMARY - MEDICATIONS TO TAKE
I will START or STAY ON the medications listed below when I get home from the hospital:    naproxen 500 mg oral tablet  -- 1 tab(s) by mouth 2 times a day  -- Indication: For Pain    aspirin 81 mg oral delayed release tablet  -- 1 tab(s) by mouth once a day  -- Indication: For STEMI involving left circumflex coronary artery    lisinopril 2.5 mg oral tablet  -- 1 tab(s) by mouth once a day  -- Indication: For STEMI involving left circumflex coronary artery    atorvastatin 80 mg oral tablet  -- 1 tab(s) by mouth once a day (at bedtime)  -- Indication: For STEMI involving left circumflex coronary artery    ticagrelor 90 mg oral tablet  -- 1 tab(s) by mouth 2 times a day  -- Indication: For STEMI involving left circumflex coronary artery    metoprolol succinate 25 mg oral tablet, extended release  -- 3 tab(s) by mouth once a day  -- Indication: For STEMI involving left circumflex coronary artery    raNITIdine 150 mg oral capsule  -- 1 cap(s) by mouth 2 times a day  -- Indication: For Gastroesophageal reflux disease, esophagitis presence not specified I will START or STAY ON the medications listed below when I get home from the hospital:    aspirin 81 mg oral delayed release tablet  -- 1 tab(s) by mouth once a day  -- Indication: For STEMI involving left circumflex coronary artery    lisinopril 2.5 mg oral tablet  -- 1 tab(s) by mouth once a day  -- Indication: For STEMI involving left circumflex coronary artery    atorvastatin 80 mg oral tablet  -- 1 tab(s) by mouth once a day (at bedtime)  -- Indication: For STEMI involving left circumflex coronary artery    ticagrelor 90 mg oral tablet  -- 1 tab(s) by mouth 2 times a day  -- Indication: For STEMI involving left circumflex coronary artery    metoprolol succinate 25 mg oral tablet, extended release  -- 3 tab(s) by mouth once a day  -- Indication: For STEMI involving left circumflex coronary artery    raNITIdine 150 mg oral capsule  -- 1 cap(s) by mouth 2 times a day  -- Indication: For Gastroesophageal reflux disease, esophagitis presence not specified

## 2018-03-15 NOTE — DISCHARGE NOTE ADULT - MEDICATION SUMMARY - MEDICATIONS TO STOP TAKING
I will STOP taking the medications listed below when I get home from the hospital:  None I will STOP taking the medications listed below when I get home from the hospital:    naproxen 500 mg oral tablet  -- 1 tab(s) by mouth 2 times a day

## 2018-03-15 NOTE — DISCHARGE NOTE ADULT - NS AS ACTIVITY OBS
Showering allowed/Walking-Outdoors allowed/Walking-Indoors allowed/Stairs allowed/No Heavy lifting/straining

## 2018-03-15 NOTE — DISCHARGE NOTE ADULT - PATIENT PORTAL LINK FT
You can access the [facility] Patient Portal, offered by [facility], by registering with the following website: [email or web address]

## 2018-03-15 NOTE — DIETITIAN INITIAL EVALUATION ADULT. - NS AS NUTRI INTERV MEALS SNACK
Continue DASH diet. Provide food preferences within dietary restrictions when feasible. Encourage continued good PO intake of meals

## 2018-03-15 NOTE — PROGRESS NOTE ADULT - PROBLEM SELECTOR PLAN 1
s/p SABINE to LCx with staged SABINE to RCA. TTE EF 42%, segmental wall hypokinesis. On asprin and Brilinta. Toprol XL 75 mg, Lipitor 80 mg, Lisinopril 2.5 mg. DASH diet, lifestyle changes. Encourage walking.

## 2018-03-15 NOTE — DIETITIAN INITIAL EVALUATION ADULT. - ADHERENCE
Patient reports poor compliance with  dietary recommendations despite fair teach back on recommendations. States he knows he has to limit salt/fat but was not following a strict diet. Amenable to diet education and handout regarding heart healthy nutrition therapy./poor

## 2018-03-15 NOTE — DIETITIAN INITIAL EVALUATION ADULT. - NS AS NUTRI INTERV ED CONTENT
Provided heart-healthy diet education including importance of following a balanced diet focused on whole grains, lean sources of protein, fruits/vegetables, low-fat dairy. Discussed food sources high in sodium (adobo/sazon, cheese, etc.) and importance of restricting intake of these foods to assist in controlling blood pressure. Provided suggestions for reducing fat intake in diet (fatty meat, ice cream, cheese, etc.) and alternative options for high fat items in diet. Encouraged patient to increase fiber intake by including more fruit, vegetables, and whole grains in diet. Provided written materials for heart healthy diet education

## 2018-03-15 NOTE — DISCHARGE NOTE ADULT - HOSPITAL COURSE
This is a 58 year old man with past medical history of GERD transfer from [facility] with LAMINE in inferior leads found to have a posterior STEMI went urgently to Cath Lab and is s/p SABINE to totally occluded LCx. Patient having runs of NSVT controlled with Lidocaine and Amio Boluses started on Lopressor with better control of PVCs. Patient for staged PCI to RCA (70%). Mulitple episodes of NSVT. Patient received PCI to RCA through radial artery. No hematoma at access site. Patient ambulated 3000 feet O2 Sats on RA with activity was 97%.

## 2018-03-15 NOTE — DIETITIAN INITIAL EVALUATION ADULT. - ENERGY NEEDS
Ht 69 inches Wt 192.9 pounds BMI 28.5 Kg/m^2   pounds +/- 10%; 121% IBW  Edema: none noted Skin: no pressure injuries   Other pertinent information: 59 yo male with PMH of GERD admitted for chest pain and found with STEMI. S/P PCI with SABINE to LCx and staged PCI with SABINE to RCA

## 2018-03-15 NOTE — DIETITIAN INITIAL EVALUATION ADULT. - OTHER INFO
Patient seen for nutrition consult on CCU. Patient seen for nutrition consult on CCU. Reorts UBW of 205 pounds, which may fluctuate up/down 5 pounds. Noted admit weight (3/12) of 204.1 pounds, (3/14) 199 pounds, and current weight (3/15) 192.9 pounds. ?wt loss vs fluid shifts since admission given weight trending down. Reports good appetite & PO intake during this admission. Per chart, 100% PO intake x1 meal noted. Reports allergy to wine, which causes throat closing. Denied micronutrient supplementation PTA. Denies chewing/swallowing difficulty. Denies nausea/emesis. Last BM 3/14 per chart

## 2018-03-15 NOTE — DISCHARGE NOTE ADULT - CARE PLAN
Principal Discharge DX:	STEMI involving left circumflex coronary artery  Goal:	Pt remains chest pain free and understands post cath discharge instructions  Assessment and plan of treatment:	Continue your medications. Do not stop Aspirin or Brilinta unless instructed by your cardiologist.  No heavy lifting, strenuous activity, bending, straining or unnecessary stair climbing for 2 weeks. No sex for 1 week.  No driving for 2 days. You may shower 24 hours following procedure but avoid baths and swimming for 1 week. Check groin site for bleeding and/or swelling daily following procedure. Call your doctor/cardiologist immediately for bleeding or swelling or if you have increased/persistent pain, fever/chills, or drainage at the site. Follow up with your cardiologist in 1- 2 weeks. You may call [facility] at [phone] or [phone] after office hours and weekends with any questions or concerns following your procedure.

## 2018-03-15 NOTE — DISCHARGE NOTE ADULT - ADDITIONAL INSTRUCTIONS
You have a scheduled appointment with Dr. Vásquez on 3/20/18 at 3pm.  Please enter from [address] at Entrance 1 because these are the Cardiac Suites.  Please call [facility], the  for Dr. Vásquez with insurance information [phone]

## 2018-03-15 NOTE — DISCHARGE NOTE ADULT - PLAN OF CARE
Pt remains chest pain free and understands post cath discharge instructions Continue your medications. Do not stop Aspirin or Brilinta unless instructed by your cardiologist.  No heavy lifting, strenuous activity, bending, straining or unnecessary stair climbing for 2 weeks. No sex for 1 week.  No driving for 2 days. You may shower 24 hours following procedure but avoid baths and swimming for 1 week. Check groin site for bleeding and/or swelling daily following procedure. Call your doctor/cardiologist immediately for bleeding or swelling or if you have increased/persistent pain, fever/chills, or drainage at the site. Follow up with your cardiologist in 1- 2 weeks. You may call [facility] at [phone] or [phone] after office hours and weekends with any questions or concerns following your procedure.

## 2018-03-15 NOTE — DISCHARGE NOTE ADULT - OTHER SIGNIFICANT FINDINGS
< from: Transthoracic Echocardiogram (03.14.18 @ 08:01) >    Patient name: SAMY HANSEN  YOB: 1960   Age: 58 (M)   MR#: [id number]  Study Date: 3/14/2018  Location: [place] [id number]: [facility]  Study quality: Technically good  Referring Physician: Scott Dolan MD  Blood Pressure: 84/57 mmHg  Height: 175 cm  Weight: 93 kg  BSA: 2.1 m2  ------------------------------------------------------------------------  PROCEDURE: Transthoracic echocardiogram with 2-D, M-Mode  and complete spectral and color flow Doppler.  INDICATION: Abnormal electrocardiogram (ECG) (EKG)  (R94.31), Atherosclerotic heart disease of native coronary  artery without angina pectoris (I25.10)  ------------------------------------------------------------------------  Dimensions:    Normal Values:  LA:     4.0    2.0 - 4.0 cm  Ao:     3.4    2.0 - 3.8 cm  SEPTUM: 1.1    0.6 - 1.2 cm  PWT:    0.9    0.6 - 1.1 cm  LVIDd:  4.9    3.0 - 5.6 cm  LVIDs:  3.9    1.8 - 4.0 cm  Derived variables:  LVMI: 85 g/m2  RWT: 0.36  Fractional short: 20 %  EF (Delilahtz): 42 %  ------------------------------------------------------------------------  Observations:  Mitral Valve: Mitral annular calcification, otherwise  normal mitral valve. Mild mitral regurgitation.  Aortic Valve/Aorta: Normal trileaflet aortic valve.  Normal aortic root (Ao: 3.4 cm at the sinuses of Valsalva).  Left Atrium: Normal left atrium.  LA volume index = 30  cc/m2.  Left Ventricle: Moderate segmental left ventricular  systolic dysfunction.  The basal to mid inferior,  inferolateral, and lateral walls are severely hypokinetic.  Endocardial visualization enhanced with intravenous  injection of echo contrast (Definity). No left ventricular  thrombus. Normal left ventricular internal dimensions and  wall thicknesses. Normal diastolic function  Right Heart: Normal right atrium. Normal right ventricular  size and function. Normal tricuspid valve. Mild tricuspid  regurgitation. Pulmonic valve not well visualized.  Pericardium/Pleura: Normal pericardium with no pericardial  effusion.  Hemodynamic: Estimated right atrial pressure is 8 mm Hg.  ------------------------------------------------------------------------  Conclusions:  1. Normal left ventricular internal dimensions and wall  thicknesses.  2. Moderate segmental leftventricular systolic  dysfunction.  The basal to mid inferior, inferolateral, and  lateral walls are severely hypokinetic.  Endocardial  visualization enhanced with intravenous injection of echo  contrast (Definity). No left ventricular thrombus.  3.Normal right ventricular size and function.  4. Estimated pulmonary artery systolic pressure equals 33  mm Hg, assuming right atrial pressure equals 8  mm Hg,  consistent with normal pulmonary pressures.  *** No previous Echo exam.  ------------------------------------------------------------------------  Confirmed on  3/14/2018 - 11:30:13 by Tavon Corona M.D.  ------------------------------------------------------------------------    < end of copied text >

## 2018-03-23 ENCOUNTER — APPOINTMENT (OUTPATIENT)
Dept: CARDIOLOGY | Facility: CLINIC | Age: 58
End: 2018-03-23
Payer: COMMERCIAL

## 2018-03-23 ENCOUNTER — NON-APPOINTMENT (OUTPATIENT)
Age: 58
End: 2018-03-23

## 2018-03-23 VITALS — DIASTOLIC BLOOD PRESSURE: 84 MMHG | SYSTOLIC BLOOD PRESSURE: 130 MMHG

## 2018-03-23 VITALS — HEART RATE: 54 BPM | OXYGEN SATURATION: 99 % | WEIGHT: 194 LBS | HEIGHT: 70 IN | BODY MASS INDEX: 27.77 KG/M2

## 2018-03-23 DIAGNOSIS — Z87.891 PERSONAL HISTORY OF NICOTINE DEPENDENCE: ICD-10-CM

## 2018-03-23 DIAGNOSIS — Z78.9 OTHER SPECIFIED HEALTH STATUS: ICD-10-CM

## 2018-03-23 DIAGNOSIS — K21.9 GASTRO-ESOPHAGEAL REFLUX DISEASE W/OUT ESOPHAGITIS: ICD-10-CM

## 2018-03-23 PROCEDURE — 93000 ELECTROCARDIOGRAM COMPLETE: CPT

## 2018-03-23 PROCEDURE — 99215 OFFICE O/P EST HI 40 MIN: CPT

## 2018-03-23 RX ORDER — METOPROLOL SUCCINATE 25 MG/1
25 TABLET, EXTENDED RELEASE ORAL DAILY
Refills: 0 | Status: DISCONTINUED | COMMUNITY
End: 2018-03-23

## 2018-03-23 RX ORDER — ASPIRIN 81 MG/1
81 TABLET, COATED ORAL DAILY
Qty: 90 | Refills: 1 | Status: ACTIVE | COMMUNITY
Start: 2018-03-15 | End: 1900-01-01

## 2018-03-23 RX ORDER — ATORVASTATIN CALCIUM 80 MG/1
80 TABLET, FILM COATED ORAL
Qty: 90 | Refills: 1 | Status: ACTIVE | COMMUNITY
Start: 1900-01-01 | End: 1900-01-01

## 2018-03-23 RX ORDER — RANITIDINE 150 MG/1
150 TABLET ORAL
Qty: 180 | Refills: 1 | Status: ACTIVE | COMMUNITY

## 2018-03-29 ENCOUNTER — NON-APPOINTMENT (OUTPATIENT)
Age: 58
End: 2018-03-29

## 2018-03-30 ENCOUNTER — NON-APPOINTMENT (OUTPATIENT)
Age: 58
End: 2018-03-30

## 2018-03-30 ENCOUNTER — APPOINTMENT (OUTPATIENT)
Dept: CARDIOLOGY | Facility: CLINIC | Age: 58
End: 2018-03-30
Payer: COMMERCIAL

## 2018-03-30 VITALS
BODY MASS INDEX: 27.77 KG/M2 | WEIGHT: 194 LBS | HEIGHT: 70 IN | OXYGEN SATURATION: 97 % | HEART RATE: 54 BPM | DIASTOLIC BLOOD PRESSURE: 81 MMHG | SYSTOLIC BLOOD PRESSURE: 122 MMHG

## 2018-03-30 PROCEDURE — 99215 OFFICE O/P EST HI 40 MIN: CPT

## 2018-03-30 PROCEDURE — 93000 ELECTROCARDIOGRAM COMPLETE: CPT

## 2018-04-04 ENCOUNTER — NON-APPOINTMENT (OUTPATIENT)
Age: 58
End: 2018-04-04

## 2018-04-13 ENCOUNTER — CLINICAL ADVICE (OUTPATIENT)
Age: 58
End: 2018-04-13

## 2018-04-20 ENCOUNTER — APPOINTMENT (OUTPATIENT)
Dept: CARDIOLOGY | Facility: CLINIC | Age: 58
End: 2018-04-20

## 2018-05-07 ENCOUNTER — APPOINTMENT (OUTPATIENT)
Dept: CARDIOLOGY | Facility: CLINIC | Age: 58
End: 2018-05-07
Payer: COMMERCIAL

## 2018-05-07 ENCOUNTER — NON-APPOINTMENT (OUTPATIENT)
Age: 58
End: 2018-05-07

## 2018-05-07 ENCOUNTER — OUTPATIENT (OUTPATIENT)
Dept: OUTPATIENT SERVICES | Facility: HOSPITAL | Age: 58
LOS: 1 days | End: 2018-05-07
Payer: COMMERCIAL

## 2018-05-07 ENCOUNTER — APPOINTMENT (OUTPATIENT)
Dept: CV DIAGNOSITCS | Facility: HOSPITAL | Age: 58
End: 2018-05-07

## 2018-05-07 VITALS
HEIGHT: 70 IN | OXYGEN SATURATION: 99 % | SYSTOLIC BLOOD PRESSURE: 121 MMHG | DIASTOLIC BLOOD PRESSURE: 76 MMHG | WEIGHT: 195 LBS | HEART RATE: 56 BPM | BODY MASS INDEX: 27.92 KG/M2

## 2018-05-07 DIAGNOSIS — I25.2 OLD MYOCARDIAL INFARCTION: ICD-10-CM

## 2018-05-07 DIAGNOSIS — I25.10 ATHEROSCLEROTIC HEART DISEASE OF NATIVE CORONARY ARTERY WITHOUT ANGINA PECTORIS: ICD-10-CM

## 2018-05-07 PROCEDURE — 99215 OFFICE O/P EST HI 40 MIN: CPT

## 2018-05-07 PROCEDURE — 93306 TTE W/DOPPLER COMPLETE: CPT

## 2018-05-07 PROCEDURE — 93306 TTE W/DOPPLER COMPLETE: CPT | Mod: 26

## 2018-05-07 PROCEDURE — 93227 XTRNL ECG REC<48 HR R&I: CPT

## 2018-05-07 PROCEDURE — 93000 ELECTROCARDIOGRAM COMPLETE: CPT

## 2018-05-07 RX ORDER — TICAGRELOR 90 MG/1
90 TABLET ORAL
Qty: 180 | Refills: 1 | Status: DISCONTINUED | COMMUNITY
End: 2018-05-07

## 2018-05-07 RX ORDER — CLOPIDOGREL BISULFATE 75 MG/1
75 TABLET, FILM COATED ORAL
Qty: 90 | Refills: 1 | Status: ACTIVE | COMMUNITY
Start: 2018-05-07 | End: 1900-01-01

## 2018-05-08 ENCOUNTER — FORM ENCOUNTER (OUTPATIENT)
Age: 58
End: 2018-05-08

## 2018-05-09 PROBLEM — I25.2 HISTORY OF ST ELEVATION MYOCARDIAL INFARCTION (STEMI): Status: RESOLVED | Noted: 2018-03-23 | Resolved: 2018-05-09

## 2018-06-08 ENCOUNTER — NON-APPOINTMENT (OUTPATIENT)
Age: 58
End: 2018-06-08

## 2018-06-08 ENCOUNTER — APPOINTMENT (OUTPATIENT)
Dept: CARDIOLOGY | Facility: CLINIC | Age: 58
End: 2018-06-08
Payer: COMMERCIAL

## 2018-06-08 VITALS
DIASTOLIC BLOOD PRESSURE: 97 MMHG | HEIGHT: 70 IN | OXYGEN SATURATION: 98 % | HEART RATE: 73 BPM | SYSTOLIC BLOOD PRESSURE: 152 MMHG

## 2018-06-08 PROCEDURE — 99215 OFFICE O/P EST HI 40 MIN: CPT

## 2018-06-08 PROCEDURE — 93000 ELECTROCARDIOGRAM COMPLETE: CPT

## 2018-06-11 ENCOUNTER — RX RENEWAL (OUTPATIENT)
Age: 58
End: 2018-06-11

## 2018-06-11 RX ORDER — METOPROLOL SUCCINATE 25 MG/1
25 TABLET, EXTENDED RELEASE ORAL DAILY
Qty: 30 | Refills: 5 | Status: ACTIVE | COMMUNITY
Start: 2018-03-23 | End: 1900-01-01

## 2018-07-03 ENCOUNTER — OUTPATIENT (OUTPATIENT)
Dept: OUTPATIENT SERVICES | Facility: HOSPITAL | Age: 58
LOS: 1 days | End: 2018-07-03
Payer: COMMERCIAL

## 2018-07-03 DIAGNOSIS — R06.09 OTHER FORMS OF DYSPNEA: ICD-10-CM

## 2018-07-03 PROCEDURE — 71046 X-RAY EXAM CHEST 2 VIEWS: CPT

## 2018-07-03 PROCEDURE — 71046 X-RAY EXAM CHEST 2 VIEWS: CPT | Mod: 26

## 2018-07-06 LAB — NT-PROBNP SERPL-MCNC: 114 PG/ML

## 2018-07-17 ENCOUNTER — APPOINTMENT (OUTPATIENT)
Dept: CV DIAGNOSTICS | Facility: HOSPITAL | Age: 58
End: 2018-07-17

## 2018-07-17 ENCOUNTER — OUTPATIENT (OUTPATIENT)
Dept: OUTPATIENT SERVICES | Facility: HOSPITAL | Age: 58
LOS: 1 days | End: 2018-07-17
Payer: COMMERCIAL

## 2018-07-17 DIAGNOSIS — I25.10 ATHEROSCLEROTIC HEART DISEASE OF NATIVE CORONARY ARTERY WITHOUT ANGINA PECTORIS: ICD-10-CM

## 2018-07-17 PROBLEM — K21.9 GASTRO-ESOPHAGEAL REFLUX DISEASE WITHOUT ESOPHAGITIS: Chronic | Status: ACTIVE | Noted: 2018-03-13

## 2018-07-17 PROCEDURE — 93016 CV STRESS TEST SUPVJ ONLY: CPT

## 2018-07-17 PROCEDURE — 78452 HT MUSCLE IMAGE SPECT MULT: CPT

## 2018-07-17 PROCEDURE — 93017 CV STRESS TEST TRACING ONLY: CPT

## 2018-07-17 PROCEDURE — 93018 CV STRESS TEST I&R ONLY: CPT

## 2018-07-17 PROCEDURE — 78452 HT MUSCLE IMAGE SPECT MULT: CPT | Mod: 26

## 2018-07-17 PROCEDURE — A9500: CPT

## 2018-07-19 ENCOUNTER — CLINICAL ADVICE (OUTPATIENT)
Age: 58
End: 2018-07-19

## 2018-07-20 ENCOUNTER — APPOINTMENT (OUTPATIENT)
Dept: CARDIOLOGY | Facility: CLINIC | Age: 58
End: 2018-07-20

## 2018-07-26 ENCOUNTER — APPOINTMENT (OUTPATIENT)
Dept: UROLOGY | Facility: CLINIC | Age: 58
End: 2018-07-26
Payer: COMMERCIAL

## 2018-07-26 VITALS
HEART RATE: 83 BPM | BODY MASS INDEX: 27.92 KG/M2 | WEIGHT: 195 LBS | OXYGEN SATURATION: 98 % | SYSTOLIC BLOOD PRESSURE: 128 MMHG | DIASTOLIC BLOOD PRESSURE: 84 MMHG | RESPIRATION RATE: 16 BRPM | HEIGHT: 70 IN

## 2018-07-26 DIAGNOSIS — Z87.898 PERSONAL HISTORY OF OTHER SPECIFIED CONDITIONS: ICD-10-CM

## 2018-07-26 DIAGNOSIS — R35.1 BENIGN PROSTATIC HYPERPLASIA WITH LOWER URINARY TRACT SYMPMS: ICD-10-CM

## 2018-07-26 DIAGNOSIS — N40.1 BENIGN PROSTATIC HYPERPLASIA WITH LOWER URINARY TRACT SYMPMS: ICD-10-CM

## 2018-07-26 DIAGNOSIS — R97.20 ELEVATED PROSTATE, SPECIFIC ANTIGEN [PSA]: ICD-10-CM

## 2018-07-26 PROCEDURE — 99204 OFFICE O/P NEW MOD 45 MIN: CPT

## 2018-07-26 RX ORDER — METOPROLOL SUCCINATE 25 MG/1
25 TABLET, EXTENDED RELEASE ORAL DAILY
Qty: 30 | Refills: 1 | Status: DISCONTINUED | COMMUNITY
Start: 2018-03-23 | End: 2018-07-26

## 2018-07-26 RX ORDER — TAMSULOSIN HYDROCHLORIDE 0.4 MG/1
0.4 CAPSULE ORAL
Qty: 90 | Refills: 3 | Status: ACTIVE | COMMUNITY
Start: 2018-07-26 | End: 1900-01-01

## 2018-07-31 ENCOUNTER — OUTPATIENT (OUTPATIENT)
Dept: OUTPATIENT SERVICES | Facility: HOSPITAL | Age: 58
LOS: 1 days | End: 2018-07-31
Payer: COMMERCIAL

## 2018-07-31 VITALS
TEMPERATURE: 98 F | OXYGEN SATURATION: 98 % | HEIGHT: 70 IN | WEIGHT: 199.96 LBS | DIASTOLIC BLOOD PRESSURE: 60 MMHG | SYSTOLIC BLOOD PRESSURE: 117 MMHG | RESPIRATION RATE: 16 BRPM | HEART RATE: 66 BPM

## 2018-07-31 DIAGNOSIS — I25.9 CHRONIC ISCHEMIC HEART DISEASE, UNSPECIFIED: ICD-10-CM

## 2018-07-31 DIAGNOSIS — I25.10 ATHEROSCLEROTIC HEART DISEASE OF NATIVE CORONARY ARTERY WITHOUT ANGINA PECTORIS: Chronic | ICD-10-CM

## 2018-07-31 LAB
ALBUMIN SERPL ELPH-MCNC: 4.8 G/DL — SIGNIFICANT CHANGE UP (ref 3.3–5)
ALP SERPL-CCNC: 120 U/L — SIGNIFICANT CHANGE UP (ref 40–120)
ALT FLD-CCNC: 81 U/L — HIGH (ref 10–45)
ANION GAP SERPL CALC-SCNC: 12 MMOL/L — SIGNIFICANT CHANGE UP (ref 5–17)
AST SERPL-CCNC: 30 U/L — SIGNIFICANT CHANGE UP (ref 10–40)
BILIRUB SERPL-MCNC: 0.6 MG/DL — SIGNIFICANT CHANGE UP (ref 0.2–1.2)
BUN SERPL-MCNC: 23 MG/DL — SIGNIFICANT CHANGE UP (ref 7–23)
CALCIUM SERPL-MCNC: 9.6 MG/DL — SIGNIFICANT CHANGE UP (ref 8.4–10.5)
CHLORIDE SERPL-SCNC: 100 MMOL/L — SIGNIFICANT CHANGE UP (ref 96–108)
CO2 SERPL-SCNC: 27 MMOL/L — SIGNIFICANT CHANGE UP (ref 22–31)
CREAT SERPL-MCNC: 1.45 MG/DL — HIGH (ref 0.5–1.3)
GLUCOSE SERPL-MCNC: 111 MG/DL — HIGH (ref 70–99)
HCT VFR BLD CALC: 52.1 % — HIGH (ref 39–50)
HGB BLD-MCNC: 16.9 G/DL — SIGNIFICANT CHANGE UP (ref 13–17)
MCHC RBC-ENTMCNC: 30 PG — SIGNIFICANT CHANGE UP (ref 27–34)
MCHC RBC-ENTMCNC: 32.4 GM/DL — SIGNIFICANT CHANGE UP (ref 32–36)
MCV RBC AUTO: 92.6 FL — SIGNIFICANT CHANGE UP (ref 80–100)
PLATELET # BLD AUTO: 244 K/UL — SIGNIFICANT CHANGE UP (ref 150–400)
POTASSIUM SERPL-MCNC: 4.2 MMOL/L — SIGNIFICANT CHANGE UP (ref 3.5–5.3)
POTASSIUM SERPL-SCNC: 4.2 MMOL/L — SIGNIFICANT CHANGE UP (ref 3.5–5.3)
PROT SERPL-MCNC: 7.7 G/DL — SIGNIFICANT CHANGE UP (ref 6–8.3)
RBC # BLD: 5.62 M/UL — SIGNIFICANT CHANGE UP (ref 4.2–5.8)
RBC # FLD: 12.5 % — SIGNIFICANT CHANGE UP (ref 10.3–14.5)
SODIUM SERPL-SCNC: 139 MMOL/L — SIGNIFICANT CHANGE UP (ref 135–145)
WBC # BLD: 7 K/UL — SIGNIFICANT CHANGE UP (ref 3.8–10.5)
WBC # FLD AUTO: 7 K/UL — SIGNIFICANT CHANGE UP (ref 3.8–10.5)

## 2018-07-31 PROCEDURE — C1769: CPT

## 2018-07-31 PROCEDURE — 93005 ELECTROCARDIOGRAM TRACING: CPT

## 2018-07-31 PROCEDURE — 93458 L HRT ARTERY/VENTRICLE ANGIO: CPT | Mod: 26

## 2018-07-31 PROCEDURE — 93458 L HRT ARTERY/VENTRICLE ANGIO: CPT

## 2018-07-31 PROCEDURE — 93010 ELECTROCARDIOGRAM REPORT: CPT

## 2018-07-31 PROCEDURE — C1894: CPT

## 2018-07-31 PROCEDURE — 80053 COMPREHEN METABOLIC PANEL: CPT

## 2018-07-31 PROCEDURE — 85027 COMPLETE CBC AUTOMATED: CPT

## 2018-07-31 PROCEDURE — C1887: CPT

## 2018-07-31 RX ORDER — CLOPIDOGREL BISULFATE 75 MG/1
1 TABLET, FILM COATED ORAL
Qty: 0 | Refills: 0 | COMMUNITY

## 2018-07-31 RX ORDER — CLOPIDOGREL BISULFATE 75 MG/1
1 TABLET, FILM COATED ORAL
Qty: 90 | Refills: 3
Start: 2018-07-31 | End: 2019-07-25

## 2018-07-31 NOTE — H&P CARDIOLOGY - HISTORY OF PRESENT ILLNESS
58 year old  male with PMH of former smoker, MI, CAD, HTN, HLD, fatty liver and pre DM presents today for cardiac cath. Patient reports he was admitted with STEMI in March 2018, at which time he had PCII/stents to LCX/RCA. During CCU admission, noted to have NSVT during first 24 hours post AMI. Since discharge to home, has noted continuation of symptoms of MORRISON and intermittent left sided chest pain on exertion. Referred today for further evaluation.

## 2018-07-31 NOTE — H&P CARDIOLOGY - PMH
CAD (coronary artery disease)  stents  DM (diabetes mellitus)  pre  Fatty liver    Former smoker    Gastroesophageal reflux disease, esophagitis presence not specified    HLD (hyperlipidemia)    HTN (hypertension)    MI (myocardial infarction)

## 2018-08-02 ENCOUNTER — CLINICAL ADVICE (OUTPATIENT)
Age: 58
End: 2018-08-02

## 2018-08-04 PROBLEM — I25.10 ATHEROSCLEROTIC HEART DISEASE OF NATIVE CORONARY ARTERY WITHOUT ANGINA PECTORIS: Chronic | Status: ACTIVE | Noted: 2018-07-31

## 2018-08-04 PROBLEM — E78.5 HYPERLIPIDEMIA, UNSPECIFIED: Chronic | Status: ACTIVE | Noted: 2018-07-31

## 2018-08-04 PROBLEM — I21.9 ACUTE MYOCARDIAL INFARCTION, UNSPECIFIED: Chronic | Status: ACTIVE | Noted: 2018-07-31

## 2018-08-04 PROBLEM — K76.0 FATTY (CHANGE OF) LIVER, NOT ELSEWHERE CLASSIFIED: Chronic | Status: ACTIVE | Noted: 2018-07-31

## 2018-08-04 PROBLEM — E11.9 TYPE 2 DIABETES MELLITUS WITHOUT COMPLICATIONS: Chronic | Status: ACTIVE | Noted: 2018-07-31

## 2018-08-04 PROBLEM — I10 ESSENTIAL (PRIMARY) HYPERTENSION: Chronic | Status: ACTIVE | Noted: 2018-07-31

## 2018-08-04 PROBLEM — Z87.891 PERSONAL HISTORY OF NICOTINE DEPENDENCE: Chronic | Status: ACTIVE | Noted: 2018-07-31

## 2018-08-15 ENCOUNTER — NON-APPOINTMENT (OUTPATIENT)
Age: 58
End: 2018-08-15

## 2018-08-15 ENCOUNTER — APPOINTMENT (OUTPATIENT)
Dept: CARDIOLOGY | Facility: CLINIC | Age: 58
End: 2018-08-15
Payer: COMMERCIAL

## 2018-08-15 VITALS — SYSTOLIC BLOOD PRESSURE: 122 MMHG | OXYGEN SATURATION: 99 % | DIASTOLIC BLOOD PRESSURE: 80 MMHG | HEART RATE: 69 BPM

## 2018-08-15 DIAGNOSIS — R06.09 OTHER FORMS OF DYSPNEA: ICD-10-CM

## 2018-08-15 DIAGNOSIS — I25.10 ATHEROSCLEROTIC HEART DISEASE OF NATIVE CORONARY ARTERY W/OUT ANGINA PECTORIS: ICD-10-CM

## 2018-08-15 DIAGNOSIS — E78.5 HYPERLIPIDEMIA, UNSPECIFIED: ICD-10-CM

## 2018-08-15 PROCEDURE — 99215 OFFICE O/P EST HI 40 MIN: CPT

## 2018-08-15 PROCEDURE — 93000 ELECTROCARDIOGRAM COMPLETE: CPT

## 2018-08-17 ENCOUNTER — RX RENEWAL (OUTPATIENT)
Age: 58
End: 2018-08-17

## 2018-08-17 RX ORDER — ASPIRIN 81 MG/1
81 TABLET ORAL DAILY
Qty: 90 | Refills: 1 | Status: ACTIVE | COMMUNITY
Start: 2018-08-17 | End: 1900-01-01

## 2018-08-20 ENCOUNTER — NON-APPOINTMENT (OUTPATIENT)
Age: 58
End: 2018-08-20

## 2018-08-20 PROBLEM — E78.5 HYPERLIPEMIA: Status: ACTIVE | Noted: 2018-04-04

## 2018-08-20 PROBLEM — R06.09 DYSPNEA ON EXERTION: Status: ACTIVE | Noted: 2018-05-09

## 2018-08-20 PROBLEM — I25.10 CORONARY ARTERY DISEASE: Status: ACTIVE | Noted: 2018-03-23

## 2018-08-21 NOTE — PATIENT PROFILE ADULT. - SPIRITUAL CULTURAL, RELIGIOUS PRACTICES/VALUES, PROFILE
Anahi Car    Chief Complaint   Patient presents with    Leg Pain       History and Physical    79year-old following up with her treatment for peripheral artery disease. She has tibial occlusive disease and musculoskeletal foot deformity. She has no new symptoms to report she has some pain in the lower extremities in the morning but when she gets up to the chair and about it does improve. She has a recurrent healing nonhealing ulcer on the right lateral ankle. Otherwise no new symptoms. She is here with her caregiver today. No chest pain no shortness of breath. No diarrhea. No complications with Pletal    Past Medical History:   Diagnosis Date    Glaucoma     Heart abnormality     arrythmia    Hypertension     Reflux     Short leg syndrome, right, acquired      Patient Active Problem List   Diagnosis Code    HTN (hypertension) I10    Lipid disorder E78.9    Glaucoma H40.9    GERD (gastroesophageal reflux disease) K21.9    PAD [facility]peripheral artery disease) ([facility]) I73.9    Toe deformity M20.60     Past Surgical History:   Procedure Laterality Date    HX HEENT      VASCULAR SURGERY PROCEDURE UNLIST       Current Outpatient Prescriptions   Medication Sig Dispense Refill    cilostazol (PLETAL) 100 mg tablet Take 1 Tab by mouth Before breakfast and dinner. 60 Tab 3    omeprazole (PRILOSEC) 20 mg capsule Take 20 mg by mouth daily.  hydroCHLOROthiazide (HYDRODIURIL) 25 mg tablet Take 25 mg by mouth daily.  metoprolol tartrate (LOPRESSOR) 50 mg tablet Take  by mouth two (2) times a day.  memantine (NAMENDA) 10 mg tablet Take  by mouth daily.  QUEtiapine (SEROQUEL) 50 mg tablet Take 50 mg by mouth two (2) times a day.  cilostazol (PLETAL) 100 mg tablet Take 1 Tab by mouth Before breakfast and dinner. 60 Tab 3    aspirin delayed-release 81 mg tablet Take  by mouth daily.  magnesium citrate solution Take 296 mL by mouth now.       metoclopramide HCl (REGLAN) 10 mg tablet Take 10 mg by mouth Before breakfast, lunch, dinner and at bedtime.  senna (SENOKOT) 8.6 mg tablet Take 1 Tab by mouth daily.  traMADol (ULTRAM) 50 mg tablet Take 50 mg by mouth every six (6) hours as needed for Pain.  atorvastatin (LIPITOR) 10 mg tablet Take  by mouth daily.  meloxicam (MOBIC) 7.5 mg tablet Take  by mouth daily.  polyethylene glycol (MIRALAX) 17 gram packet Take 17 g by mouth daily.  meclizine (ANTIVERT) 25 mg tablet Take  by mouth three (3) times daily as needed.  amLODIPine (NORVASC) 10 mg tablet Take  by mouth daily.  RABEprazole (ACIPHEX) 20 mg tablet Take 20 mg by mouth daily.  therapeutic multivitamin (THERAGRAN) tablet Take 1 Tab by mouth daily.  Calcium Gluconate 60 mg (648 mg) tab Take  by mouth.  levobunolol (BETAGAN) 0.5 % ophthalmic solution Administer 1 Drop to both eyes nightly.  brinzolamide (AZOPT) 1 % ophthalmic suspension Administer 1 Drop to both eyes three (3) times daily. Allergies   Allergen Reactions    Tylenol [Acetaminophen] Other (comments)     GI distress       Review of Systems    A full review of systems was completed times ten organ systems and was deemed negative unless otherwise mentioned in the HPI. Physical   Visit Vitals    /60 (BP 1 Location: Left arm, BP Patient Position: Sitting)    Pulse (!) 50    Resp 17    Ht 5' 5\" (1.651 m)    Wt 98 lb (44.5 kg)    BMI 16.31 kg/m2       She appears well today she is in good spirits  Head is normocephalic  Neck no JVD  Chest clear  Cardiac regular  Abdomen soft nontender  Right lower extremity has this musculoskeletal deficit with equinus deformity, there is a 1 cm healed ulcer on the right ankle  Left lower extremity with no ulceration    Impression/Plan:     ICD-10-CM ICD-9-CM    1.  PAD (peripheral artery disease) ([facility]) I73.9 443.9 cilostazol (PLETAL) 100 mg tablet      LOWER EXT ART PVR MULT LEVEL SEG PRESSURES   I think she has had a favorable response to Pletal would continue this therapy with her best medical therapy  We will see her back in 3 months with a new  Leg study to see if she has had any improvement in her ankle-brachial index. Orders Placed This Encounter    cilostazol (PLETAL) 100 mg tablet       Follow-up Disposition:  Return in about 3 months (around 11/21/2018). Rossi Reed MD    PLEASE NOTE:  This document has been produced using voice recognition software. Unrecognized errors in transcription may be present. none

## 2018-09-02 ENCOUNTER — TRANSCRIPTION ENCOUNTER (OUTPATIENT)
Age: 58
End: 2018-09-02

## 2018-10-11 ENCOUNTER — APPOINTMENT (OUTPATIENT)
Dept: UROLOGY | Facility: CLINIC | Age: 58
End: 2018-10-11

## 2018-10-16 ENCOUNTER — EMERGENCY (EMERGENCY)
Facility: HOSPITAL | Age: 58
LOS: 0 days | Discharge: HOME | End: 2018-10-16
Attending: EMERGENCY MEDICINE
Payer: COMMERCIAL

## 2018-10-16 VITALS
HEART RATE: 88 BPM | SYSTOLIC BLOOD PRESSURE: 131 MMHG | WEIGHT: 205.03 LBS | OXYGEN SATURATION: 98 % | TEMPERATURE: 98 F | RESPIRATION RATE: 16 BRPM | HEIGHT: 70 IN | DIASTOLIC BLOOD PRESSURE: 89 MMHG

## 2018-10-16 VITALS
RESPIRATION RATE: 19 BRPM | SYSTOLIC BLOOD PRESSURE: 123 MMHG | HEART RATE: 73 BPM | OXYGEN SATURATION: 97 % | DIASTOLIC BLOOD PRESSURE: 87 MMHG | TEMPERATURE: 99 F

## 2018-10-16 DIAGNOSIS — Z87.891 PERSONAL HISTORY OF NICOTINE DEPENDENCE: ICD-10-CM

## 2018-10-16 DIAGNOSIS — R42 DIZZINESS AND GIDDINESS: ICD-10-CM

## 2018-10-16 DIAGNOSIS — I25.10 ATHEROSCLEROTIC HEART DISEASE OF NATIVE CORONARY ARTERY WITHOUT ANGINA PECTORIS: Chronic | ICD-10-CM

## 2018-10-16 DIAGNOSIS — I25.10 ATHEROSCLEROTIC HEART DISEASE OF NATIVE CORONARY ARTERY WITHOUT ANGINA PECTORIS: ICD-10-CM

## 2018-10-16 DIAGNOSIS — I10 ESSENTIAL (PRIMARY) HYPERTENSION: ICD-10-CM

## 2018-10-16 DIAGNOSIS — Z79.02 LONG TERM (CURRENT) USE OF ANTITHROMBOTICS/ANTIPLATELETS: ICD-10-CM

## 2018-10-16 LAB
ALBUMIN SERPL ELPH-MCNC: 3.8 G/DL — SIGNIFICANT CHANGE UP (ref 3.3–5)
ALP SERPL-CCNC: 115 U/L — SIGNIFICANT CHANGE UP (ref 40–120)
ALT FLD-CCNC: 56 U/L — SIGNIFICANT CHANGE UP (ref 12–78)
ANION GAP SERPL CALC-SCNC: 4 MMOL/L — LOW (ref 5–17)
APPEARANCE UR: CLEAR — SIGNIFICANT CHANGE UP
APTT BLD: 29.3 SEC — SIGNIFICANT CHANGE UP (ref 27.5–37.4)
AST SERPL-CCNC: 22 U/L — SIGNIFICANT CHANGE UP (ref 15–37)
BASOPHILS # BLD AUTO: 0.05 K/UL — SIGNIFICANT CHANGE UP (ref 0–0.2)
BASOPHILS NFR BLD AUTO: 0.6 % — SIGNIFICANT CHANGE UP (ref 0–2)
BILIRUB SERPL-MCNC: 0.5 MG/DL — SIGNIFICANT CHANGE UP (ref 0.2–1.2)
BILIRUB UR-MCNC: NEGATIVE — SIGNIFICANT CHANGE UP
BUN SERPL-MCNC: 17 MG/DL — SIGNIFICANT CHANGE UP (ref 7–23)
CALCIUM SERPL-MCNC: 8.7 MG/DL — SIGNIFICANT CHANGE UP (ref 8.5–10.1)
CHLORIDE SERPL-SCNC: 106 MMOL/L — SIGNIFICANT CHANGE UP (ref 96–108)
CK MB CFR SERPL CALC: 1.1 NG/ML — SIGNIFICANT CHANGE UP (ref 0.5–3.6)
CO2 SERPL-SCNC: 31 MMOL/L — SIGNIFICANT CHANGE UP (ref 22–31)
COLOR SPEC: YELLOW — SIGNIFICANT CHANGE UP
CREAT SERPL-MCNC: 1.07 MG/DL — SIGNIFICANT CHANGE UP (ref 0.5–1.3)
DIFF PNL FLD: NEGATIVE — SIGNIFICANT CHANGE UP
EOSINOPHIL # BLD AUTO: 0.09 K/UL — SIGNIFICANT CHANGE UP (ref 0–0.5)
EOSINOPHIL NFR BLD AUTO: 1.1 % — SIGNIFICANT CHANGE UP (ref 0–6)
GLUCOSE SERPL-MCNC: 89 MG/DL — SIGNIFICANT CHANGE UP (ref 70–99)
GLUCOSE UR QL: NEGATIVE MG/DL — SIGNIFICANT CHANGE UP
HCT VFR BLD CALC: 47.3 % — SIGNIFICANT CHANGE UP (ref 39–50)
HGB BLD-MCNC: 15.8 G/DL — SIGNIFICANT CHANGE UP (ref 13–17)
IMM GRANULOCYTES NFR BLD AUTO: 0.4 % — SIGNIFICANT CHANGE UP (ref 0–1.5)
INR BLD: 0.98 RATIO — SIGNIFICANT CHANGE UP (ref 0.88–1.16)
KETONES UR-MCNC: NEGATIVE — SIGNIFICANT CHANGE UP
LEUKOCYTE ESTERASE UR-ACNC: NEGATIVE — SIGNIFICANT CHANGE UP
LYMPHOCYTES # BLD AUTO: 1.28 K/UL — SIGNIFICANT CHANGE UP (ref 1–3.3)
LYMPHOCYTES # BLD AUTO: 16 % — SIGNIFICANT CHANGE UP (ref 13–44)
MAGNESIUM SERPL-MCNC: 2.4 MG/DL — SIGNIFICANT CHANGE UP (ref 1.6–2.6)
MCHC RBC-ENTMCNC: 30.6 PG — SIGNIFICANT CHANGE UP (ref 27–34)
MCHC RBC-ENTMCNC: 33.4 GM/DL — SIGNIFICANT CHANGE UP (ref 32–36)
MCV RBC AUTO: 91.7 FL — SIGNIFICANT CHANGE UP (ref 80–100)
MONOCYTES # BLD AUTO: 0.77 K/UL — SIGNIFICANT CHANGE UP (ref 0–0.9)
MONOCYTES NFR BLD AUTO: 9.6 % — SIGNIFICANT CHANGE UP (ref 2–14)
NEUTROPHILS # BLD AUTO: 5.79 K/UL — SIGNIFICANT CHANGE UP (ref 1.8–7.4)
NEUTROPHILS NFR BLD AUTO: 72.3 % — SIGNIFICANT CHANGE UP (ref 43–77)
NITRITE UR-MCNC: NEGATIVE — SIGNIFICANT CHANGE UP
NRBC # BLD: 0 /100 WBCS — SIGNIFICANT CHANGE UP (ref 0–0)
PH UR: 6 — SIGNIFICANT CHANGE UP (ref 5–8)
PLATELET # BLD AUTO: 270 K/UL — SIGNIFICANT CHANGE UP (ref 150–400)
POTASSIUM SERPL-MCNC: 4.1 MMOL/L — SIGNIFICANT CHANGE UP (ref 3.5–5.3)
POTASSIUM SERPL-SCNC: 4.1 MMOL/L — SIGNIFICANT CHANGE UP (ref 3.5–5.3)
PROT SERPL-MCNC: 7.3 GM/DL — SIGNIFICANT CHANGE UP (ref 6–8.3)
PROT UR-MCNC: NEGATIVE MG/DL — SIGNIFICANT CHANGE UP
PROTHROM AB SERPL-ACNC: 10.7 SEC — SIGNIFICANT CHANGE UP (ref 9.8–12.7)
RBC # BLD: 5.16 M/UL — SIGNIFICANT CHANGE UP (ref 4.2–5.8)
RBC # FLD: 13.4 % — SIGNIFICANT CHANGE UP (ref 10.3–14.5)
SODIUM SERPL-SCNC: 141 MMOL/L — SIGNIFICANT CHANGE UP (ref 135–145)
SP GR SPEC: 1.01 — SIGNIFICANT CHANGE UP (ref 1.01–1.02)
TROPONIN I SERPL-MCNC: <.015 NG/ML — SIGNIFICANT CHANGE UP (ref 0.01–0.04)
UROBILINOGEN FLD QL: NEGATIVE MG/DL — SIGNIFICANT CHANGE UP
WBC # BLD: 8.01 K/UL — SIGNIFICANT CHANGE UP (ref 3.8–10.5)
WBC # FLD AUTO: 8.01 K/UL — SIGNIFICANT CHANGE UP (ref 3.8–10.5)

## 2018-10-16 PROCEDURE — 93010 ELECTROCARDIOGRAM REPORT: CPT

## 2018-10-16 PROCEDURE — 71045 X-RAY EXAM CHEST 1 VIEW: CPT | Mod: 26

## 2018-10-16 PROCEDURE — 70551 MRI BRAIN STEM W/O DYE: CPT | Mod: 26

## 2018-10-16 PROCEDURE — 99285 EMERGENCY DEPT VISIT HI MDM: CPT

## 2018-10-16 RX ORDER — SODIUM CHLORIDE 9 MG/ML
1000 INJECTION INTRAMUSCULAR; INTRAVENOUS; SUBCUTANEOUS ONCE
Qty: 0 | Refills: 0 | Status: COMPLETED | OUTPATIENT
Start: 2018-10-16 | End: 2018-10-16

## 2018-10-16 RX ORDER — MECLIZINE HCL 12.5 MG
25 TABLET ORAL ONCE
Qty: 0 | Refills: 0 | Status: COMPLETED | OUTPATIENT
Start: 2018-10-16 | End: 2018-10-16

## 2018-10-16 RX ORDER — MECLIZINE HCL 12.5 MG
1 TABLET ORAL
Qty: 15 | Refills: 0
Start: 2018-10-16 | End: 2018-10-20

## 2018-10-16 RX ADMIN — SODIUM CHLORIDE 1000 MILLILITER(S): 9 INJECTION INTRAMUSCULAR; INTRAVENOUS; SUBCUTANEOUS at 13:34

## 2018-10-16 RX ADMIN — Medication 25 MILLIGRAM(S): at 16:55

## 2018-10-16 RX ADMIN — SODIUM CHLORIDE 1000 MILLILITER(S): 9 INJECTION INTRAMUSCULAR; INTRAVENOUS; SUBCUTANEOUS at 16:45

## 2018-10-16 RX ADMIN — SODIUM CHLORIDE 1000 MILLILITER(S): 9 INJECTION INTRAMUSCULAR; INTRAVENOUS; SUBCUTANEOUS at 15:39

## 2018-10-16 RX ADMIN — Medication 25 MILLIGRAM(S): at 13:35

## 2018-10-16 NOTE — ED ADULT NURSE NOTE - OBJECTIVE STATEMENT
received er bed 2 c/o intermittant dizziness x 2 1/2 days lightheaded sensation worse with movement denies n/v denies headache or blurry vision

## 2018-10-16 NOTE — ED PROVIDER NOTE - MEDICAL DECISION MAKING DETAILS
patient pw vertigo. suspect peripheral process but will obtain mr to rule out cva given he is a vasculopath patient pw vertigo. suspect peripheral process but will obtain mr to rule out cva given he is a vasculopath. I read ekg as nsr rate 83, inferior qs, no st elevation or depression, no qtc or pr prolongation, axis wnl. patient pw vertigo. suspect peripheral process but will obtain mr to rule out cva given he is a vasculopath. I read ekg as nsr rate 83, inferior qs, no st elevation or depression, no qtc or pr prolongation, axis wnl. mri brain reassuring. labs reassuring. patient no longer vertiginous after tx. okay for dc home.

## 2018-10-16 NOTE — ED PROVIDER NOTE - OBJECTIVE STATEMENT
Pertinent PMH/PSH/FHx/SHx and Review of Systems contained within:  58M hx cad with stents x2, htn, hld, pw dizziness. patient notes that for the past 3 days when he moves he feels dizziness. he denies cp, sob, diaphoresis. yesterday patient got up and fell into the door but not hard. no head trauam or loc. patient denies abd pain, numbness, tingling, slurred speech, ha, vision change, rhinorrhea, fever, chills. patient has not taken anything for pain  Fh and Sh not otherwise contributory  ROS otherwise negative

## 2019-03-05 ENCOUNTER — EMERGENCY (EMERGENCY)
Facility: HOSPITAL | Age: 59
LOS: 0 days | Discharge: HOME | End: 2019-03-05
Attending: EMERGENCY MEDICINE
Payer: COMMERCIAL

## 2019-03-05 VITALS
HEART RATE: 686 BPM | TEMPERATURE: 98 F | OXYGEN SATURATION: 98 % | DIASTOLIC BLOOD PRESSURE: 89 MMHG | WEIGHT: 207.9 LBS | SYSTOLIC BLOOD PRESSURE: 139 MMHG | HEIGHT: 70 IN | RESPIRATION RATE: 17 BRPM

## 2019-03-05 VITALS
RESPIRATION RATE: 18 BRPM | DIASTOLIC BLOOD PRESSURE: 70 MMHG | SYSTOLIC BLOOD PRESSURE: 117 MMHG | TEMPERATURE: 98 F | OXYGEN SATURATION: 99 % | HEART RATE: 88 BPM

## 2019-03-05 DIAGNOSIS — I25.10 ATHEROSCLEROTIC HEART DISEASE OF NATIVE CORONARY ARTERY WITHOUT ANGINA PECTORIS: Chronic | ICD-10-CM

## 2019-03-05 DIAGNOSIS — R07.9 CHEST PAIN, UNSPECIFIED: ICD-10-CM

## 2019-03-05 DIAGNOSIS — Z87.891 PERSONAL HISTORY OF NICOTINE DEPENDENCE: ICD-10-CM

## 2019-03-05 DIAGNOSIS — E11.9 TYPE 2 DIABETES MELLITUS WITHOUT COMPLICATIONS: ICD-10-CM

## 2019-03-05 DIAGNOSIS — Z79.02 LONG TERM (CURRENT) USE OF ANTITHROMBOTICS/ANTIPLATELETS: ICD-10-CM

## 2019-03-05 DIAGNOSIS — I25.2 OLD MYOCARDIAL INFARCTION: ICD-10-CM

## 2019-03-05 DIAGNOSIS — I25.10 ATHEROSCLEROTIC HEART DISEASE OF NATIVE CORONARY ARTERY WITHOUT ANGINA PECTORIS: ICD-10-CM

## 2019-03-05 LAB
ALBUMIN SERPL ELPH-MCNC: 3.8 G/DL — SIGNIFICANT CHANGE UP (ref 3.3–5)
ALP SERPL-CCNC: 146 U/L — HIGH (ref 40–120)
ALT FLD-CCNC: 80 U/L — HIGH (ref 12–78)
ANION GAP SERPL CALC-SCNC: 5 MMOL/L — SIGNIFICANT CHANGE UP (ref 5–17)
APTT BLD: 32.4 SEC — SIGNIFICANT CHANGE UP (ref 27.5–36.3)
AST SERPL-CCNC: 41 U/L — HIGH (ref 15–37)
BILIRUB SERPL-MCNC: 0.5 MG/DL — SIGNIFICANT CHANGE UP (ref 0.2–1.2)
BUN SERPL-MCNC: 18 MG/DL — SIGNIFICANT CHANGE UP (ref 7–23)
CALCIUM SERPL-MCNC: 8.5 MG/DL — SIGNIFICANT CHANGE UP (ref 8.5–10.1)
CHLORIDE SERPL-SCNC: 109 MMOL/L — HIGH (ref 96–108)
CK MB BLD-MCNC: 0.9 % — SIGNIFICANT CHANGE UP (ref 0–3.5)
CK MB BLD-MCNC: 1.1 % — SIGNIFICANT CHANGE UP (ref 0–3.5)
CK MB CFR SERPL CALC: 1.1 NG/ML — SIGNIFICANT CHANGE UP (ref 0.5–3.6)
CK MB CFR SERPL CALC: 1.1 NG/ML — SIGNIFICANT CHANGE UP (ref 0.5–3.6)
CK SERPL-CCNC: 100 U/L — SIGNIFICANT CHANGE UP (ref 26–308)
CK SERPL-CCNC: 116 U/L — SIGNIFICANT CHANGE UP (ref 26–308)
CO2 SERPL-SCNC: 28 MMOL/L — SIGNIFICANT CHANGE UP (ref 22–31)
CREAT SERPL-MCNC: 1.05 MG/DL — SIGNIFICANT CHANGE UP (ref 0.5–1.3)
GLUCOSE SERPL-MCNC: 97 MG/DL — SIGNIFICANT CHANGE UP (ref 70–99)
HCT VFR BLD CALC: 49.5 % — SIGNIFICANT CHANGE UP (ref 39–50)
HGB BLD-MCNC: 16.4 G/DL — SIGNIFICANT CHANGE UP (ref 13–17)
INR BLD: 0.97 RATIO — SIGNIFICANT CHANGE UP (ref 0.88–1.16)
LIDOCAIN IGE QN: 138 U/L — SIGNIFICANT CHANGE UP (ref 73–393)
MCHC RBC-ENTMCNC: 30.4 PG — SIGNIFICANT CHANGE UP (ref 27–34)
MCHC RBC-ENTMCNC: 33.1 GM/DL — SIGNIFICANT CHANGE UP (ref 32–36)
MCV RBC AUTO: 91.7 FL — SIGNIFICANT CHANGE UP (ref 80–100)
NRBC # BLD: 0 /100 WBCS — SIGNIFICANT CHANGE UP (ref 0–0)
NT-PROBNP SERPL-SCNC: 37 PG/ML — SIGNIFICANT CHANGE UP (ref 0–125)
PLATELET # BLD AUTO: 228 K/UL — SIGNIFICANT CHANGE UP (ref 150–400)
POTASSIUM SERPL-MCNC: 4.4 MMOL/L — SIGNIFICANT CHANGE UP (ref 3.5–5.3)
POTASSIUM SERPL-SCNC: 4.4 MMOL/L — SIGNIFICANT CHANGE UP (ref 3.5–5.3)
PROT SERPL-MCNC: 7.4 GM/DL — SIGNIFICANT CHANGE UP (ref 6–8.3)
PROTHROM AB SERPL-ACNC: 10.9 SEC — SIGNIFICANT CHANGE UP (ref 10–12.9)
RBC # BLD: 5.4 M/UL — SIGNIFICANT CHANGE UP (ref 4.2–5.8)
RBC # FLD: 13.2 % — SIGNIFICANT CHANGE UP (ref 10.3–14.5)
SODIUM SERPL-SCNC: 142 MMOL/L — SIGNIFICANT CHANGE UP (ref 135–145)
TROPONIN I SERPL-MCNC: <.015 NG/ML — SIGNIFICANT CHANGE UP (ref 0.01–0.04)
TROPONIN I SERPL-MCNC: <.015 NG/ML — SIGNIFICANT CHANGE UP (ref 0.01–0.04)
WBC # BLD: 7.24 K/UL — SIGNIFICANT CHANGE UP (ref 3.8–10.5)
WBC # FLD AUTO: 7.24 K/UL — SIGNIFICANT CHANGE UP (ref 3.8–10.5)

## 2019-03-05 PROCEDURE — 71045 X-RAY EXAM CHEST 1 VIEW: CPT | Mod: 26

## 2019-03-05 PROCEDURE — 99285 EMERGENCY DEPT VISIT HI MDM: CPT

## 2019-03-05 RX ORDER — ASPIRIN/CALCIUM CARB/MAGNESIUM 324 MG
162 TABLET ORAL ONCE
Qty: 0 | Refills: 0 | Status: COMPLETED | OUTPATIENT
Start: 2019-03-05 | End: 2019-03-05

## 2019-03-05 RX ADMIN — Medication 162 MILLIGRAM(S): at 11:40

## 2019-03-05 NOTE — ED PROVIDER NOTE - OBJECTIVE STATEMENT
this is a 59 m w pmhx of mi, htn, cardaic stent,  neck herniation c/o of left side chest pain x 1 week. the pain is intermittent. the patient has no pain now. Denies sob, nausea, vomiting, fever, recent traveling this is a 59 m w pmhx of mi, htn, cardiac stent,  neck herniation c/o of left side chest pain x 1 week. the pain is intermittent. the patient has no pain now. Denies sob, nausea, vomiting, fever, recent traveling

## 2019-03-05 NOTE — ED ADULT TRIAGE NOTE - CHIEF COMPLAINT QUOTE
Difficulty breathing, feeling tired and chest pains for 1 week on and off He was seen yesterday PCP  and she did CXR and  EKG instructed to come to the hospital further evaluation. He has history of MI 3/2018 he had 2 Stents placed

## 2019-03-05 NOTE — ED PROVIDER NOTE - ATTENDING CONTRIBUTION TO CARE
59 years old male c/o left side intermittent chest pain without associated symptoms of dizziness, sob, cough, nausea, vomiting, abd pain. Pt sts he has a hx of MI with cardiac stent x 2 in March of 2018. Pt sts the chest pain is different quality from the past. Pt denies headache, blurred visions, light sensitivities, focal/distal weakness or numbness, fever, chills, abd pain, dysuria, calf pain, or irregular bowel movement. Pt is speaking in clear full sentences no nasal flaring no shoulders retractions no diaphoresis, S1/S2, breath sounds are clear equal bilaterally, abd is soft nontender to palp. ambulating with normal gaits, no focal neuro deficits on exam. Agree with PA eval/treatment/dispo.

## 2019-03-05 NOTE — ED PROVIDER NOTE - CLINICAL SUMMARY MEDICAL DECISION MAKING FREE TEXT BOX
asa f/u w pmd or cardiologist in 2 days for stress test, echo and further evaluation Exit care given

## 2019-03-05 NOTE — ED ADULT NURSE NOTE - MUSCULOSKELETAL WDL
Arranged transportation thru [facility] for the below appt:  Appt Date & Time: 10/11/17 @ 12:45pm  Clinic Name & Address:  [facility] - [address] [place] [address]   Transportation Provider: [place] [employer]    time:  11:45 - 12:15pm    Notified member of  time.    Gely Kenny  Case Management Specialist  [facility]  [phone]       Full range of motion of upper and lower extremities, no joint tenderness/swelling.

## 2019-05-21 ENCOUNTER — MEDICATION RENEWAL (OUTPATIENT)
Age: 59
End: 2019-05-21

## 2019-05-22 RX ORDER — LISINOPRIL 2.5 MG/1
2.5 TABLET ORAL
Qty: 30 | Refills: 0 | Status: ACTIVE | COMMUNITY
Start: 1900-01-01 | End: 1900-01-01

## 2020-01-08 NOTE — DISCHARGE NOTE ADULT - CLICK TO LAUNCH ORM
. Suturegard Body: The suture ends were repeatedly re-tightened and re-clamped to achieve the desired tissue expansion.

## 2022-01-04 NOTE — PROVIDER CONTACT NOTE (CRITICAL VALUE NOTIFICATION) - NS PROVIDER READ BACK TO LAB
Anesthesia Evaluation     Patient summary reviewed and Nursing notes reviewed   no history of anesthetic complications:  NPO Solid Status: > 8 hours  NPO Liquid Status: > 8 hours           Airway   Dental      Pulmonary    (+) a smoker Former,   Cardiovascular         Neuro/Psych  (+) psychiatric history Anxiety and Depression,     GI/Hepatic/Renal/Endo    (+)  GERD,      Musculoskeletal     Abdominal    Substance History      OB/GYN          Other        ROS/Med Hx Other: Missed     PSH  TONSILLECTOMY SALPINGO OOPHORECTOMY  MOUTH SURGERY HYSTEROSCOPY  DIAGNOSTIC LAPAROSCOPY LASER ABLATION/CAUTERIZATION OF ENDOMETRIAL IMPLANTS  HYSTEROSALPINGOGRAM                   Anesthesia Plan    ASA 2     general   (Patient identified; pre-operative vital signs, all relevant labs/studies, complete medical/surgical/anesthetic history, full medication list, full allergy list, and NPO status obtained/reviewed; physical assessment performed; anesthetic options, side effects, potential complications, risks, and benefits discussed; questions answered; written anesthesia consent obtained; patient cleared for procedure; anesthesia machine and equipment checked and functioning)  intravenous induction     Anesthetic plan, all risks, benefits, and alternatives have been provided, discussed and informed consent has been obtained with: patient.    Plan discussed with CRNA and CAA.       yes

## 2022-04-22 NOTE — ED ADULT NURSE NOTE - CAS TRG GEN SKIN CONDITION
Dry/Warm Itraconazole Counseling:  I discussed with the patient the risks of itraconazole including but not limited to liver damage, nausea/vomiting, neuropathy, and severe allergy.  The patient understands that this medication is best absorbed when taken with acidic beverages such as non-diet cola or ginger ale.  The patient understands that monitoring is required including baseline LFTs and repeat LFTs at intervals.  The patient understands that they are to contact us or the primary physician if concerning signs are noted.

## 2023-04-17 NOTE — DISCHARGE NOTE ADULT - NS AS DC STROKE DX YN
Hpi Title: Evaluation of Skin Lesions Have Your Spot(S) Been Treated In The Past?: has not been treated no

## 2023-06-21 NOTE — ED PROVIDER NOTE - MUSCULOSKELETAL, MLM
[Dear  ___] : Dear  [unfilled], [Courtesy Letter:] : I had the pleasure of seeing your patient, [unfilled], in my office today. [Please see my note below.] : Please see my note below. [Consult Closing:] : Thank you very much for allowing me to participate in the care of this patient.  If you have any questions, please do not hesitate to contact me. [Sincerely,] : Sincerely, Spine appears normal, range of motion is not limited, no muscle or joint tenderness

## 2023-08-24 ENCOUNTER — EMERGENCY (EMERGENCY)
Facility: HOSPITAL | Age: 63
LOS: 0 days | Discharge: HOME | End: 2023-08-24
Attending: STUDENT IN AN ORGANIZED HEALTH CARE EDUCATION/TRAINING PROGRAM
Payer: MEDICARE

## 2023-08-24 VITALS
SYSTOLIC BLOOD PRESSURE: 118 MMHG | HEIGHT: 70 IN | TEMPERATURE: 98 F | OXYGEN SATURATION: 99 % | HEART RATE: 75 BPM | WEIGHT: 225.09 LBS | DIASTOLIC BLOOD PRESSURE: 76 MMHG | RESPIRATION RATE: 18 BRPM

## 2023-08-24 DIAGNOSIS — Z79.82 LONG TERM (CURRENT) USE OF ASPIRIN: ICD-10-CM

## 2023-08-24 DIAGNOSIS — Z95.5 PRESENCE OF CORONARY ANGIOPLASTY IMPLANT AND GRAFT: ICD-10-CM

## 2023-08-24 DIAGNOSIS — K76.0 FATTY (CHANGE OF) LIVER, NOT ELSEWHERE CLASSIFIED: ICD-10-CM

## 2023-08-24 DIAGNOSIS — Z87.891 PERSONAL HISTORY OF NICOTINE DEPENDENCE: ICD-10-CM

## 2023-08-24 DIAGNOSIS — I10 ESSENTIAL (PRIMARY) HYPERTENSION: ICD-10-CM

## 2023-08-24 DIAGNOSIS — Z86.79 PERSONAL HISTORY OF OTHER DISEASES OF THE CIRCULATORY SYSTEM: ICD-10-CM

## 2023-08-24 DIAGNOSIS — M25.421 EFFUSION, RIGHT ELBOW: ICD-10-CM

## 2023-08-24 DIAGNOSIS — R73.03 PREDIABETES: ICD-10-CM
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
## 2023-08-24 DIAGNOSIS — M25.521 PAIN IN RIGHT ELBOW: ICD-10-CM

## 2023-08-24 DIAGNOSIS — E78.5 HYPERLIPIDEMIA, UNSPECIFIED: ICD-10-CM

## 2023-08-24 DIAGNOSIS — Z87.19 PERSONAL HISTORY OF OTHER DISEASES OF THE DIGESTIVE SYSTEM: ICD-10-CM

## 2023-08-24 DIAGNOSIS — I25.2 OLD MYOCARDIAL INFARCTION: ICD-10-CM

## 2023-08-24 DIAGNOSIS — Z79.02 LONG TERM (CURRENT) USE OF ANTITHROMBOTICS/ANTIPLATELETS: ICD-10-CM

## 2023-08-24 DIAGNOSIS — I25.10 ATHEROSCLEROTIC HEART DISEASE OF NATIVE CORONARY ARTERY WITHOUT ANGINA PECTORIS: Chronic | ICD-10-CM

## 2023-08-24 PROCEDURE — 73080 X-RAY EXAM OF ELBOW: CPT | Mod: 26,RT

## 2023-08-24 PROCEDURE — 99284 EMERGENCY DEPT VISIT MOD MDM: CPT | Mod: FS

## 2023-08-24 RX ORDER — METOPROLOL TARTRATE 50 MG
1 TABLET ORAL
Qty: 0 | Refills: 0 | DISCHARGE

## 2023-08-24 RX ORDER — TAMSULOSIN HYDROCHLORIDE 0.4 MG/1
1 CAPSULE ORAL
Qty: 0 | Refills: 0 | DISCHARGE

## 2023-08-24 RX ORDER — IBUPROFEN 200 MG
600 TABLET ORAL ONCE
Refills: 0 | Status: COMPLETED | OUTPATIENT
Start: 2023-08-24 | End: 2023-08-24

## 2023-08-24 RX ORDER — ACETAMINOPHEN 500 MG
650 TABLET ORAL ONCE
Refills: 0 | Status: COMPLETED | OUTPATIENT
Start: 2023-08-24 | End: 2023-08-24

## 2023-08-24 RX ORDER — RANITIDINE HYDROCHLORIDE 150 MG/1
1 TABLET, FILM COATED ORAL
Qty: 0 | Refills: 0 | DISCHARGE

## 2023-08-24 RX ORDER — IBUPROFEN 200 MG
1 TABLET ORAL
Qty: 12 | Refills: 0
Start: 2023-08-24 | End: 2023-08-26

## 2023-08-24 RX ADMIN — Medication 600 MILLIGRAM(S): at 15:36

## 2023-08-24 RX ADMIN — Medication 600 MILLIGRAM(S): at 14:35

## 2023-08-24 RX ADMIN — Medication 650 MILLIGRAM(S): at 15:36

## 2023-08-24 RX ADMIN — Medication 650 MILLIGRAM(S): at 14:35

## 2023-08-24 NOTE — ED PROVIDER NOTE - CARE PROVIDER_API CALL
Jaime Henry  Orthopaedic Surgery  [address] [place] [address]  Phone: [phone]  Fax: [phone]  Follow Up Time:

## 2023-08-24 NOTE — ED ADULT NURSE NOTE - NSICDXPASTMEDICALHX_GEN_ALL_CORE_FT
PAST MEDICAL HISTORY:  CAD (coronary artery disease) stents    DM (diabetes mellitus) pre    Fatty liver     Former smoker     Gastroesophageal reflux disease, esophagitis presence not specified     HLD (hyperlipidemia)     HTN (hypertension)     MI (myocardial infarction)

## 2023-08-24 NOTE — ED PROVIDER NOTE - PATIENT PORTAL LINK FT
You can access the FollowMyHealth Patient Portal offered by [facility] by registering at the following website: [email or web address]. By joining [email or web address]’s FollowMyHealth portal, you will also be able to view your health information using other applications (apps) compatible with our system.

## 2023-08-24 NOTE — ED ADULT NURSE NOTE - NSFALLHARMRISKINTERV_ED_ALL_ED

## 2023-08-24 NOTE — ED ADULT NURSE NOTE - OBJECTIVE STATEMENT
patient is A&Ox4. Breathing unlabored on RA. PMH DM, HLD, HTN, GERD, CAD, MI, cadiac stent. on aspirin and Plavix. patient complaining of right elbow pain x one week. patient reports changing the tire a week ago. states "I heard 3-4 cracks". patient complaining of increased pain with movement. c/o pain when lifting up the arm. reports using ice and Bengay at home. denies any relief. Patient denies any sob, difficulty breathing, dizziness, headache, vision changes, cp, palpations, abdominal pain, n/v/d, fever, chills, numbness, tingling, urinary symptoms, LE swelling.

## 2023-08-24 NOTE — ED PROVIDER NOTE - OBJECTIVE STATEMENT
63M with PMH HTN, HLD, CAD (s/p stents), GERD, Fatty Liver Disease who presents to ED 63M with PMH HTN, HLD, CAD (s/p stents), GERD, Fatty Liver Disease who presents to ED with right-sided elbow pain/swelling x 1 week. Pt states that he was changing the brakes on his car and felt a "cracking" sensation in the right elbow, since then he's been having reproducible pain with movement of the affected elbow or lifting objects. 63M with PMH HTN, HLD, CAD (s/p stents), GERD, Fatty Liver Disease who presents to ED with right-sided elbow pain/swelling x 1 week. Pt states that he was changing the brakes on his car and felt a "cracking" sensation in the right elbow, since then he's been having reproducible pain with movement of the affected elbow or lifting objects. Denies fever, chills, chest pain, shortness of breath, abdominal pain, N/V/D, dysuria, urinary frequency/urgency, extremity weakness/numbness/tingling, lightheadedness, dizziness, or headaches. 63M with PMH HTN, HLD, CAD (s/p stents), GERD, Fatty Liver Disease who presents to ED with right-sided elbow pain/swelling x 1 week. Pt states that he was changing the brakes on his car and felt a "cracking" sensation in the right elbow, since then he's been having reproducible pain with movement of the affected elbow such as twisting motions of the arm or wrist movements, and with lifting objects, pt has been applying Bengay and ice to the affected area, states symptoms have slowly been improving over the past week but is still present, pt reports previous fracture to the affected right forearm. Denies fever, chills, chest pain, shortness of breath, abdominal pain, N/V/D, dysuria, urinary frequency/urgency, extremity weakness/numbness/tingling, lightheadedness, dizziness, or headaches.

## 2023-08-24 NOTE — ED ADULT NURSE NOTE - NSICDXFAMILYHX_GEN_ALL_CORE_FT
FAMILY HISTORY:  Father  Still living? Unknown  Family history of diabetes mellitus, Age at diagnosis: Age Unknown    Mother  Still living? Unknown  Family history of diabetes mellitus, Age at diagnosis: Age Unknown    Sibling  Still living? Unknown  Family history of diabetes mellitus, Age at diagnosis: Age Unknown

## 2023-08-24 NOTE — ED PROVIDER NOTE - NSFOLLOWUPCLINICS_GEN_ALL_ED_FT
[facility] Orthopedic Surgery  Orthopedic Surgery  [address], 3rd & 4th [place] [address]  Phone: [phone]  Fax:     [facility]  Orthopedic Surgery  [address] [address]  Phone: [phone]  Fax:     [facility]  Orthopedic Surgery  [address] [place] [address]  Phone: [phone]  Fax:

## 2023-08-24 NOTE — ED PROVIDER NOTE - CLINICAL SUMMARY MEDICAL DECISION MAKING FREE TEXT BOX
Patient currently afebrile, hemodynamically stable, spO2 100%. Based on history and physical, differentials include but are not limited to . Plan to assess patient for acute pathology as listed above with . Will administer medications for symptomatic relief, follow-up on results, and reassess. 63M with PMH HTN, HLD, CAD (s/p stents), GERD, Fatty Liver Disease who presents to ED with right-sided elbow pain/swelling x 1 week. Pt states that he was changing the brakes on his car and felt a "cracking" sensation in the right elbow, since then he's been having reproducible pain with movement of the affected elbow such as twisting motions of the arm or wrist movements, and with lifting objects, pt has been applying Bengay and ice to the affected area, states symptoms have slowly been improving over the past week but is still present, pt reports previous fracture to the affected right forearm. Patient currently afebrile, hemodynamically stable, spO2 99%. Based on history and physical, differentials include but are not limited to right elbow fracture/dislocation, tendonitis, ligamentous injury, elbow strain/sprain/spasm, lateral epicondylitis. Plan to assess patient for acute pathology as listed above with XRAY RT Elbow. Will administer medications for symptomatic relief, follow-up on results, and reassess.

## 2023-08-24 NOTE — ED PROVIDER NOTE - MUSCULOSKELETAL MINIMAL EXAM
Spine appears normal, range of motion is not limited, no muscle or joint tenderness Spine appears normal, range of motion is not limited, no muscle or joint tenderness, no midline spinal tenderness.

## 2023-08-24 NOTE — ED PROVIDER NOTE - UPPER EXTREMITY EXAM, RIGHT
SWELLING/TENDERNESS + mild swelling and ttp overlying the lateral epicondyle, pain reproduced with wrist extension and pronation c/w lateral epicondylitis, no obvious deformity/bruising visualized. No bony ttp overlying the shoulder joint/olecranon/radius/ulna/wrist joint./SWELLING/TENDERNESS

## 2023-08-24 NOTE — ED PROVIDER NOTE - NSFOLLOWUPINSTRUCTIONS_ED_ALL_ED_FT
Follow-up with Orthopedics for worsening/persistent right elbow pain.    Medications  - Take Tylenol (Acetaminophen) 650 mg every 6 hours AND/OR Motrin (Ibuprofen) 600 mg every 8 hours as needed for pain.    Advance activity as tolerated.  Continue all previously prescribed medications as directed unless otherwise instructed.  Follow up with your primary care physician in 48-72 hours- bring copies of your results.  Return to the ER for worsening or persistent symptoms, and/or ANY NEW OR CONCERNING SYMPTOMS worsening elbow pain associated with weakness/numbness/tingling of the affected arm, skin changes such as redness/warmth overlying the affected joint. If you have issues obtaining follow up, please call: [phone] to obtain a doctor or specialist who takes your insurance in your area.  You may call [phone] to make an appointment with the internal medicine clinic.      Tennis elbow is swelling (inflammation) in your outer forearm, near your elbow. Swelling affects the tissues that connect muscle to bone (tendons). Tennis elbow can happen in any sport or job in which you use your elbow too much. It is caused by doing the same motion over and over. Tennis elbow can cause:    Pain and tenderness in your forearm and the outer part of your elbow. You may have pain all the time, or only when using the arm.  A burning feeling. This runs from your elbow through your arm.  Weak  in your hand.    Follow these instructions at home:      Activity    Rest your elbow and wrist. Avoid activities that cause problems, as told by your doctor.  If told by your doctor, wear an elbow strap to reduce stress on the area.  Do physical therapy exercises as told.  If you lift an object, lift it with your palm facing up. This is easier on your elbow.        Lifestyle    If your tennis elbow is caused by sports, check your equipment and make sure that:    You are using it correctly.  It fits you well.  If your tennis elbow is caused by work or by using a computer, take breaks often to stretch your arm. Talk with your manager about how you can manage your condition at work.        If you have a brace:    Wear the brace as told by your doctor. Remove it only as told by your doctor.  Loosen the brace if your fingers tingle, get numb, or turn cold and blue.  Keep the brace clean.  If the brace is not waterproof, ask your doctor if you may take the brace off for bathing. If you must keep the brace on while bathing:    Do not let it get wet.  Cover it with a watertight covering when you take a bath or a shower.    General instructions     If told, put ice on the painful area:    Put ice in a plastic bag.  Place a towel between your skin and the bag.  Leave the ice on for 20 minutes, 2–3 times a day.  Take over-the-counter and prescription medicines only as told by your doctor.  Keep all follow-up visits as told by your doctor. This is important.    Contact a doctor if:  Your pain does not get better with treatment.  Your pain gets worse.  You have weakness in your forearm, hand, or fingers.  You cannot feel your forearm, hand, or fingers.    Summary  Tennis elbow is swelling (inflammation) in your outer forearm, near your elbow.   Tennis elbow is caused by doing the same motion over and over.  Rest your elbow and wrist. Avoid activities that cause problems, as told by your doctor.  If told, put ice on the painful area for 20 minutes, 2–3 times a day.    ADDITIONAL NOTES AND INSTRUCTIONS    Please follow up with your Primary MD in 24-48 hr.  Seek immediate medical care for any new/worsening signs or symptoms. Follow-up with Orthopedics for worsening/persistent right elbow pain.    Medications  - Take Tylenol (Acetaminophen) 650 mg every 6 hours AND/OR Motrin (Ibuprofen) 600 mg every 8 hours as needed for pain.    Advance activity as tolerated.  Continue all previously prescribed medications as directed unless otherwise instructed.  Follow up with your primary care physician in 48-72 hours- bring copies of your results.  Return to the ER for worsening or persistent symptoms, and/or ANY NEW OR CONCERNING SYMPTOMS worsening elbow pain associated with weakness/numbness/tingling of the affected arm, skin changes such as redness/warmth overlying the affected joint. If you have issues obtaining follow up, please call: [phone] to obtain a doctor or specialist who takes your insurance in your area.  You may call [phone] to make an appointment with the internal medicine clinic.    Tennis elbow is swelling (inflammation) in your outer forearm, near your elbow. Swelling affects the tissues that connect muscle to bone (tendons). Tennis elbow can happen in any sport or job in which you use your elbow too much. It is caused by doing the same motion over and over. Tennis elbow can cause:    Pain and tenderness in your forearm and the outer part of your elbow. You may have pain all the time, or only when using the arm.  A burning feeling. This runs from your elbow through your arm.  Weak  in your hand.    Follow these instructions at home:    Activity    Rest your elbow and wrist. Avoid activities that cause problems, as told by your doctor.  If told by your doctor, wear an elbow strap to reduce stress on the area.  Do physical therapy exercises as told.  If you lift an object, lift it with your palm facing up. This is easier on your elbow.    Lifestyle    If your tennis elbow is caused by sports, check your equipment and make sure that:    You are using it correctly.  It fits you well.  If your tennis elbow is caused by work or by using a computer, take breaks often to stretch your arm. Talk with your manager about how you can manage your condition at work.    If you have a brace:    Wear the brace as told by your doctor. Remove it only as told by your doctor.  Loosen the brace if your fingers tingle, get numb, or turn cold and blue.  Keep the brace clean.  If the brace is not waterproof, ask your doctor if you may take the brace off for bathing. If you must keep the brace on while bathing:    Do not let it get wet.  Cover it with a watertight covering when you take a bath or a shower.    General instructions     If told, put ice on the painful area:    Put ice in a plastic bag.  Place a towel between your skin and the bag.  Leave the ice on for 20 minutes, 2–3 times a day.  Take over-the-counter and prescription medicines only as told by your doctor.  Keep all follow-up visits as told by your doctor. This is important.    Contact a doctor if:  Your pain does not get better with treatment.  Your pain gets worse.  You have weakness in your forearm, hand, or fingers.  You cannot feel your forearm, hand, or fingers.    Summary  Tennis elbow is swelling (inflammation) in your outer forearm, near your elbow.   Tennis elbow is caused by doing the same motion over and over.  Rest your elbow and wrist. Avoid activities that cause problems, as told by your doctor.  If told, put ice on the painful area for 20 minutes, 2–3 times a day.    ADDITIONAL NOTES AND INSTRUCTIONS    Please follow up with your Primary MD in 24-48 hr.  Seek immediate medical care for any new/worsening signs or symptoms.

## 2023-08-24 NOTE — ED ADULT TRIAGE NOTE - CHIEF COMPLAINT QUOTE
pt c/o right elbow injury while changing tire x 1 week ago. used Bengay and ice with no relief. hx: tennis elbow

## 2023-08-24 NOTE — ED PROVIDER NOTE - ATTENDING APP SHARED VISIT CONTRIBUTION OF CARE
64 y/o M with PMH HTN, HLD, CAD (s/p stents), GERD, Fatty Liver Disease who presents to ED with right-sided elbow pain/swelling x 1 week. Pt states that he was changing the brakes on his car and felt a "cracking" sensation in the right elbow, since then he's been having reproducible pain with movement of the affected elbow. Using bengay and ice with minimal relief.  In the ED, vitals stable.  Patient is well appearing in NAD.  Will obtain XR of R elbow.  Likely needs pain control/ortho follow up.

## 2023-08-24 NOTE — ED PROVIDER NOTE - EXTREMITY EXAM
right upper extremity findings Moving all extremities equally, full strength against resistance, sensation intact./right upper extremity findings

## 2023-08-24 NOTE — ED PROVIDER NOTE - PROGRESS NOTE DETAILS
JUNG Rahman: Workup reviewed - XRAY RT Elbow with no acute emergent findings. Results endorsed including unexpected incidental findings (copy of reports provided to patient). Shared Decision Making - Reassessment performed. Patient is medically stable for discharge. Strict return precautions given, discussed red flag signs/symptoms. Patient to follow up with PMD. Patient displays understanding and agreeable with plan, comfortable with discharge plan home. Plan for discharge discussed with Dr. Murcia who agrees with disposition and discharge plan. JUNG Rahman: Workup reviewed - XRAY RT Elbow with no acute emergent findings. Results endorsed including unexpected incidental findings (copy of reports provided to patient). Shared Decision Making - Reassessment performed, pt able to range the affected elbow upon reassessment. Patient is medically stable for discharge. Strict return precautions given, discussed red flag signs/symptoms. Patient to follow up with PMD, Ortho for worsening/persisting pain. Patient displays understanding and agreeable with plan, comfortable with discharge plan home. Plan for discharge discussed with Dr. Murcia who agrees with disposition and discharge plan.

## 2023-11-01 NOTE — ED ADULT NURSE NOTE - PAIN: BODY LOCATION
Patient reported abdominal cramping in his lower mid abdomen, patient given bentyl PO per order. When RN exited patient's room, RN noticed on the monitor that patient's HR was 160. RN immediately returned to patient's room, where patient c/o abdominal cramping. BP assessed and Alvin Price to patient's bedside. H&H ordered and while attempting to collect, patient reported increase in cramping sensation, rolled onto his right side and grabbed onto this RN's shirt while his whole body went stiff. Patient would not respond to painful stimuli for approx 45 seconds. Rapid Response was called by Alvin Price. While waiting of RR team, patient was responsive and continued to report abdominal cramping. VSS and HR return to 100. RR team arrived and assessed patient. RR team aware of plans to transfer pt to [facility] and a telemetry bed. RR team left after determining patient was stable at the time.   After RR team left the unit, patient given IV pain medication and reported having to have a BM. Patient had large BM that was dark red in color and large clots were noted. Patient reported decrease in abdominal cramping following BM. Alvin Price and  were notified, GI stat paged, and RR returned to patient's bedside.   Decision to transfer patient to ICU was made by Alvin Price and Dr Loo with STAT consults to IR and general surgery.   Patient transferred to ICU by RR team.   Attempted to call report to ICU x2, but nurse was unable to take report and will call this RN for report.    chest

## 2024-01-29 NOTE — ED PROVIDER NOTE - OBJECTIVE STATEMENT
58yoM; with pmh signif for peripheral neuropathy; now p/w chest pain. patient c/o generalized malaise for past few weeks. was working at home and yard yesterday and been having back pain since yesterday, waxing and waning in intensity. 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 denies sob. denies abd pain. denies vomiting. denies f/c/s. denies trauma. denies headache. denies focal weakness. denies cough. denies sick contacts or travel.  PMH: peripheral neuropathy  SOCIAL: ex-smoker.  denies illicit substance use.  socialEtOH
no...

## 2025-05-15 NOTE — DISCHARGE NOTE ADULT - NS AS DC AMI ASPIRIN YN
Quality 431: Preventive Care And Screening: Unhealthy Alcohol Use - Screening: Patient not identified as an unhealthy alcohol user when screened for unhealthy alcohol use using a systematic screening method Detail Level: Generalized Quality 47: Advance Care Plan: Advance Care Planning discussed and documented in the medical record; patient did not wish or was not able to name a surrogate decision maker or provide an advance care plan. Quality 226: Preventive Care And Screening: Tobacco Use: Screening And Cessation Intervention: Patient screened for tobacco use and is an ex/non-smoker Quality 130: Documentation Of Current Medications In The Medical Record: Current Medications Documented yes

## 2025-06-17 NOTE — ED PROVIDER NOTE - CRITICAL CARE PROVIDED
direct patient care (not related to procedure)/interpretation of diagnostic studies/consultation with other physicians/documentation/additional history taking show